# Patient Record
Sex: FEMALE | Race: WHITE | Employment: FULL TIME | URBAN - METROPOLITAN AREA
[De-identification: names, ages, dates, MRNs, and addresses within clinical notes are randomized per-mention and may not be internally consistent; named-entity substitution may affect disease eponyms.]

---

## 2023-02-03 ENCOUNTER — PATIENT MESSAGE (OUTPATIENT)
Dept: INTERNAL MEDICINE | Facility: CLINIC | Age: 33
End: 2023-02-03

## 2023-02-03 ENCOUNTER — OFFICE VISIT (OUTPATIENT)
Dept: INTERNAL MEDICINE | Facility: CLINIC | Age: 33
End: 2023-02-03
Payer: COMMERCIAL

## 2023-02-03 ENCOUNTER — LAB VISIT (OUTPATIENT)
Dept: LAB | Facility: HOSPITAL | Age: 33
End: 2023-02-03
Attending: FAMILY MEDICINE
Payer: COMMERCIAL

## 2023-02-03 VITALS
DIASTOLIC BLOOD PRESSURE: 78 MMHG | HEART RATE: 75 BPM | TEMPERATURE: 98 F | SYSTOLIC BLOOD PRESSURE: 126 MMHG | HEIGHT: 65 IN | BODY MASS INDEX: 24.98 KG/M2 | WEIGHT: 149.94 LBS | OXYGEN SATURATION: 100 %

## 2023-02-03 DIAGNOSIS — Z11.4 SCREENING FOR HIV WITHOUT PRESENCE OF RISK FACTORS: ICD-10-CM

## 2023-02-03 DIAGNOSIS — Z13.1 SCREENING FOR DIABETES MELLITUS: ICD-10-CM

## 2023-02-03 DIAGNOSIS — Z13.220 ENCOUNTER FOR LIPID SCREENING FOR CARDIOVASCULAR DISEASE: ICD-10-CM

## 2023-02-03 DIAGNOSIS — Z13.6 ENCOUNTER FOR LIPID SCREENING FOR CARDIOVASCULAR DISEASE: ICD-10-CM

## 2023-02-03 DIAGNOSIS — Z12.4 PAP SMEAR FOR CERVICAL CANCER SCREENING: ICD-10-CM

## 2023-02-03 DIAGNOSIS — Z00.00 PREVENTATIVE HEALTH CARE: Primary | ICD-10-CM

## 2023-02-03 DIAGNOSIS — Z11.59 ENCOUNTER FOR HEPATITIS C SCREENING TEST FOR LOW RISK PATIENT: ICD-10-CM

## 2023-02-03 DIAGNOSIS — F90.0 ATTENTION DEFICIT HYPERACTIVITY DISORDER (ADHD), PREDOMINANTLY INATTENTIVE TYPE: ICD-10-CM

## 2023-02-03 DIAGNOSIS — Z00.00 PREVENTATIVE HEALTH CARE: ICD-10-CM

## 2023-02-03 DIAGNOSIS — L21.9 SEBORRHEIC DERMATITIS: ICD-10-CM

## 2023-02-03 LAB
CHOLEST SERPL-MCNC: 168 MG/DL (ref 120–199)
CHOLEST/HDLC SERPL: 2.4 {RATIO} (ref 2–5)
GLUCOSE SERPL-MCNC: 96 MG/DL (ref 70–110)
HDLC SERPL-MCNC: 69 MG/DL (ref 40–75)
HDLC SERPL: 41.1 % (ref 20–50)
HIV 1+2 AB+HIV1 P24 AG SERPL QL IA: NORMAL
LDLC SERPL CALC-MCNC: 85 MG/DL (ref 63–159)
NONHDLC SERPL-MCNC: 99 MG/DL
TRIGL SERPL-MCNC: 70 MG/DL (ref 30–150)

## 2023-02-03 PROCEDURE — 87389 HIV-1 AG W/HIV-1&-2 AB AG IA: CPT | Performed by: FAMILY MEDICINE

## 2023-02-03 PROCEDURE — 80061 LIPID PANEL: CPT | Performed by: FAMILY MEDICINE

## 2023-02-03 PROCEDURE — 3008F BODY MASS INDEX DOCD: CPT | Mod: CPTII,S$GLB,, | Performed by: FAMILY MEDICINE

## 2023-02-03 PROCEDURE — 36415 COLL VENOUS BLD VENIPUNCTURE: CPT | Performed by: FAMILY MEDICINE

## 2023-02-03 PROCEDURE — 86803 HEPATITIS C AB TEST: CPT | Performed by: FAMILY MEDICINE

## 2023-02-03 PROCEDURE — 99385 PR PREVENTIVE VISIT,NEW,18-39: ICD-10-PCS | Mod: S$GLB,,, | Performed by: FAMILY MEDICINE

## 2023-02-03 PROCEDURE — 99999 PR PBB SHADOW E&M-NEW PATIENT-LVL IV: CPT | Mod: PBBFAC,,, | Performed by: FAMILY MEDICINE

## 2023-02-03 PROCEDURE — 99999 PR PBB SHADOW E&M-NEW PATIENT-LVL IV: ICD-10-PCS | Mod: PBBFAC,,, | Performed by: FAMILY MEDICINE

## 2023-02-03 PROCEDURE — 3078F PR MOST RECENT DIASTOLIC BLOOD PRESSURE < 80 MM HG: ICD-10-PCS | Mod: CPTII,S$GLB,, | Performed by: FAMILY MEDICINE

## 2023-02-03 PROCEDURE — 3074F PR MOST RECENT SYSTOLIC BLOOD PRESSURE < 130 MM HG: ICD-10-PCS | Mod: CPTII,S$GLB,, | Performed by: FAMILY MEDICINE

## 2023-02-03 PROCEDURE — 99385 PREV VISIT NEW AGE 18-39: CPT | Mod: S$GLB,,, | Performed by: FAMILY MEDICINE

## 2023-02-03 PROCEDURE — 3008F PR BODY MASS INDEX (BMI) DOCUMENTED: ICD-10-PCS | Mod: CPTII,S$GLB,, | Performed by: FAMILY MEDICINE

## 2023-02-03 PROCEDURE — 3074F SYST BP LT 130 MM HG: CPT | Mod: CPTII,S$GLB,, | Performed by: FAMILY MEDICINE

## 2023-02-03 PROCEDURE — 82947 ASSAY GLUCOSE BLOOD QUANT: CPT | Performed by: FAMILY MEDICINE

## 2023-02-03 PROCEDURE — 3078F DIAST BP <80 MM HG: CPT | Mod: CPTII,S$GLB,, | Performed by: FAMILY MEDICINE

## 2023-02-03 RX ORDER — KETOCONAZOLE 20 MG/ML
SHAMPOO, SUSPENSION TOPICAL
Qty: 240 ML | Refills: 2 | Status: SHIPPED | OUTPATIENT
Start: 2023-02-03 | End: 2024-03-11 | Stop reason: SDUPTHER

## 2023-02-03 RX ORDER — KETOCONAZOLE 20 MG/G
CREAM TOPICAL 2 TIMES DAILY
Qty: 30 G | Refills: 1 | Status: SHIPPED | OUTPATIENT
Start: 2023-02-03 | End: 2023-02-22

## 2023-02-03 RX ORDER — HYDROCORTISONE 25 MG/G
CREAM TOPICAL
Qty: 20 G | Refills: 0 | Status: SHIPPED | OUTPATIENT
Start: 2023-02-03 | End: 2024-03-11 | Stop reason: SDUPTHER

## 2023-02-04 LAB — HCV AB SERPL QL IA: NORMAL

## 2023-02-08 ENCOUNTER — PATIENT MESSAGE (OUTPATIENT)
Dept: ADMINISTRATIVE | Facility: HOSPITAL | Age: 33
End: 2023-02-08
Payer: COMMERCIAL

## 2023-02-10 ENCOUNTER — OFFICE VISIT (OUTPATIENT)
Dept: INTERNAL MEDICINE | Facility: CLINIC | Age: 33
End: 2023-02-10
Payer: COMMERCIAL

## 2023-02-10 ENCOUNTER — PATIENT MESSAGE (OUTPATIENT)
Dept: INTERNAL MEDICINE | Facility: CLINIC | Age: 33
End: 2023-02-10

## 2023-02-10 DIAGNOSIS — F90.0 ATTENTION DEFICIT HYPERACTIVITY DISORDER (ADHD), PREDOMINANTLY INATTENTIVE TYPE: Primary | ICD-10-CM

## 2023-02-10 PROCEDURE — 99214 PR OFFICE/OUTPT VISIT, EST, LEVL IV, 30-39 MIN: ICD-10-PCS | Mod: 95,,, | Performed by: FAMILY MEDICINE

## 2023-02-10 PROCEDURE — 1160F RVW MEDS BY RX/DR IN RCRD: CPT | Mod: CPTII,95,, | Performed by: FAMILY MEDICINE

## 2023-02-10 PROCEDURE — 1159F MED LIST DOCD IN RCRD: CPT | Mod: CPTII,95,, | Performed by: FAMILY MEDICINE

## 2023-02-10 PROCEDURE — 1160F PR REVIEW ALL MEDS BY PRESCRIBER/CLIN PHARMACIST DOCUMENTED: ICD-10-PCS | Mod: CPTII,95,, | Performed by: FAMILY MEDICINE

## 2023-02-10 PROCEDURE — 99214 OFFICE O/P EST MOD 30 MIN: CPT | Mod: 95,,, | Performed by: FAMILY MEDICINE

## 2023-02-10 PROCEDURE — 1159F PR MEDICATION LIST DOCUMENTED IN MEDICAL RECORD: ICD-10-PCS | Mod: CPTII,95,, | Performed by: FAMILY MEDICINE

## 2023-02-10 RX ORDER — DEXTROAMPHETAMINE SACCHARATE, AMPHETAMINE ASPARTATE MONOHYDRATE, DEXTROAMPHETAMINE SULFATE AND AMPHETAMINE SULFATE 5; 5; 5; 5 MG/1; MG/1; MG/1; MG/1
20 CAPSULE, EXTENDED RELEASE ORAL EVERY MORNING
Qty: 30 CAPSULE | Refills: 0 | Status: SHIPPED | OUTPATIENT
Start: 2023-02-10 | End: 2023-03-10 | Stop reason: SDUPTHER

## 2023-02-10 NOTE — PATIENT INSTRUCTIONS
"Hi, Miranda.    It was nice seeing you again during your virtual visit today. I sent your prescription (listed below) to your [facility].    If you haven't already done so, please take the time now to schedule a virtual visit appointment with me in about four weeks (less than 30 days). As we discussed, if a timely virtual visit is not available right now, try again on Monday morning to schedule the appointment.     Please read the educational information about your medicine (Adderall-XR) in your After-Visit Summary. (Click on "Appointments," choose today's appointment, and click on "View After Visit Summary.")    Thanks for letting me care for you, and thanks for trusting [facility] with your healthcare needs.    All the best,    HARLEY Gillis MD      Medications Ordered This Encounter   Medications    dextroamphetamine-amphetamine (ADDERALL XR) 20 MG 24 hr capsule     Sig: Take 1 capsule (20 mg total) by mouth every morning.     Dispense:  30 capsule     Refill:  0      "

## 2023-02-10 NOTE — TELEPHONE ENCOUNTER
Rebeca Epps.    Can you please update their HM based on the documentation they provided?    Thank you for your help caring for our patients!    -DEBO

## 2023-02-10 NOTE — PROGRESS NOTES
TELEMEDICINE VIRTUAL VISIT  2/10/23  1:30 PM CST    Visit Details: This visit was a telemedicine virtual visit with synchronous audio and video. Miranda represented that her location at the time of this visit was in the [facility]. Miranda chose and consented to receive these medical services by telemedicine.    Subjective   CHIEF COMPLAINT: Follow-up and ADHD    HPI  Review of Systems   Neurological:  Negative for seizures and memory loss.   Psychiatric/Behavioral:  Positive for decreased concentration. Negative for agitation, confusion, dysphoric mood, hallucinations and suicidal ideas. The patient is not hyperactive.     Assessment & Plan   1. Attention deficit hyperactivity disorder (ADHD), predominantly inattentive type  Overview:  For substantiating documentation, refer to chart media for document uploaded by patient 2/3/2023 at 1:21 PM: %[id number]-[email or web address].[email or web address]    Assessment & Plan:  She is in grad school for political science. Psychological report for ADD evaluation reviewed with patient. She endorses mild depression and moderate anxiety symptoms, but she feels that these originate from her ADD symptoms. We discussed risks and benefits of treatment options.    Orders:  -     dextroamphetamine-amphetamine (ADDERALL XR) 20 MG 24 hr capsule; Take 1 capsule (20 mg total) by mouth every morning.  Dispense: 30 capsule; Refill: 0    Unless noted herein, any chronic conditions are represented as and appear compensated/controlled and stable, and no other significant complaints or concerns were reported.    Follow up in about 4 weeks (around 3/10/2023) for re-evaluate problem(s) discussed today.   Future Appointments   Date Time Provider Department Center   2/16/2023  1:00 PM Rajwinder Ruiz MD [facility]     Objective   Physical Exam  Constitutional:       General: She is not in acute distress.     Appearance: She is not ill-appearing.  "  Pulmonary:      Effort: Pulmonary effort is normal. No respiratory distress.   Skin:     Coloration: Skin is not jaundiced.   Neurological:      Mental Status: She is oriented to person, place, and time. Mental status is at baseline.   Psychiatric:         Mood and Affect: Mood normal.         Behavior: Behavior normal.         Thought Content: Thought content normal.         Judgment: Judgment normal.        Orders Placed This Encounter    dextroamphetamine-amphetamine (ADDERALL XR) 20 MG 24 hr capsule     TOTAL TIME evaluating and managing this patient for this encounter was greater than or equal to 34 minutes. This time was spent personally by me on the following activities: review of patient's past medical history, assessing age-appropriate health maintenance needs, review of any interval history, medication reconciliation, obtaining history from the patient, examination of the patient, reviewing outside records, review of [facility] Controlled Prescription Drug Monitoring database records for the patient, managing and/or ordering prescription medications, educating patient and answering their questions about risks and benefits of treatment options, educating patient and answering their questions about treatment plan, goals of treatment, and follow-up, and final documentation of the visit. This time was exclusive of any separately billable procedures for this patient and exclusive of time spent treating any other patients.    Documentation entered by me for this encounter may have been done in part using speech-recognition technology. Although I have made an effort to ensure accuracy, "sound like" errors may exist and should be interpreted in context.    Each patient to whom medical services are provided by telemedicine is: (1) informed of the relationship between the physician and patient and the respective role of any other health care provider with respect to management of the patient; and " (2) notified that he or she may decline to receive medical services by telemedicine and may withdraw from such care at any time.

## 2023-02-10 NOTE — ASSESSMENT & PLAN NOTE
She is in grad school for political science. Psychological report for ADD evaluation reviewed with patient. She endorses mild depression and moderate anxiety symptoms, but she feels that these originate from her ADD symptoms. We discussed risks and benefits of treatment options.

## 2023-02-16 ENCOUNTER — OFFICE VISIT (OUTPATIENT)
Dept: OBSTETRICS AND GYNECOLOGY | Facility: CLINIC | Age: 33
End: 2023-02-16
Payer: COMMERCIAL

## 2023-02-16 VITALS
SYSTOLIC BLOOD PRESSURE: 141 MMHG | HEIGHT: 65 IN | DIASTOLIC BLOOD PRESSURE: 94 MMHG | BODY MASS INDEX: 24.63 KG/M2 | WEIGHT: 147.81 LBS

## 2023-02-16 DIAGNOSIS — Z12.4 CERVICAL CANCER SCREENING: ICD-10-CM

## 2023-02-16 DIAGNOSIS — Z31.9 PATIENT DESIRES PREGNANCY: ICD-10-CM

## 2023-02-16 DIAGNOSIS — Z01.419 ENCOUNTER FOR WELL WOMAN EXAM WITH ROUTINE GYNECOLOGICAL EXAM: Primary | ICD-10-CM

## 2023-02-16 PROCEDURE — 88175 CYTOPATH C/V AUTO FLUID REDO: CPT | Performed by: OBSTETRICS & GYNECOLOGY

## 2023-02-16 PROCEDURE — 99999 PR PBB SHADOW E&M-EST. PATIENT-LVL III: ICD-10-PCS | Mod: PBBFAC,,, | Performed by: OBSTETRICS & GYNECOLOGY

## 2023-02-16 PROCEDURE — 3077F PR MOST RECENT SYSTOLIC BLOOD PRESSURE >= 140 MM HG: ICD-10-PCS | Mod: CPTII,S$GLB,, | Performed by: OBSTETRICS & GYNECOLOGY

## 2023-02-16 PROCEDURE — 1160F RVW MEDS BY RX/DR IN RCRD: CPT | Mod: CPTII,S$GLB,, | Performed by: OBSTETRICS & GYNECOLOGY

## 2023-02-16 PROCEDURE — 3008F PR BODY MASS INDEX (BMI) DOCUMENTED: ICD-10-PCS | Mod: CPTII,S$GLB,, | Performed by: OBSTETRICS & GYNECOLOGY

## 2023-02-16 PROCEDURE — 3008F BODY MASS INDEX DOCD: CPT | Mod: CPTII,S$GLB,, | Performed by: OBSTETRICS & GYNECOLOGY

## 2023-02-16 PROCEDURE — 3080F DIAST BP >= 90 MM HG: CPT | Mod: CPTII,S$GLB,, | Performed by: OBSTETRICS & GYNECOLOGY

## 2023-02-16 PROCEDURE — 1159F MED LIST DOCD IN RCRD: CPT | Mod: CPTII,S$GLB,, | Performed by: OBSTETRICS & GYNECOLOGY

## 2023-02-16 PROCEDURE — 3077F SYST BP >= 140 MM HG: CPT | Mod: CPTII,S$GLB,, | Performed by: OBSTETRICS & GYNECOLOGY

## 2023-02-16 PROCEDURE — 1159F PR MEDICATION LIST DOCUMENTED IN MEDICAL RECORD: ICD-10-PCS | Mod: CPTII,S$GLB,, | Performed by: OBSTETRICS & GYNECOLOGY

## 2023-02-16 PROCEDURE — 87624 HPV HI-RISK TYP POOLED RSLT: CPT | Performed by: OBSTETRICS & GYNECOLOGY

## 2023-02-16 PROCEDURE — 3080F PR MOST RECENT DIASTOLIC BLOOD PRESSURE >= 90 MM HG: ICD-10-PCS | Mod: CPTII,S$GLB,, | Performed by: OBSTETRICS & GYNECOLOGY

## 2023-02-16 PROCEDURE — 99385 PR PREVENTIVE VISIT,NEW,18-39: ICD-10-PCS | Mod: S$GLB,,, | Performed by: OBSTETRICS & GYNECOLOGY

## 2023-02-16 PROCEDURE — 99999 PR PBB SHADOW E&M-EST. PATIENT-LVL III: CPT | Mod: PBBFAC,,, | Performed by: OBSTETRICS & GYNECOLOGY

## 2023-02-16 PROCEDURE — 99385 PREV VISIT NEW AGE 18-39: CPT | Mod: S$GLB,,, | Performed by: OBSTETRICS & GYNECOLOGY

## 2023-02-16 PROCEDURE — 1160F PR REVIEW ALL MEDS BY PRESCRIBER/CLIN PHARMACIST DOCUMENTED: ICD-10-PCS | Mod: CPTII,S$GLB,, | Performed by: OBSTETRICS & GYNECOLOGY

## 2023-02-16 NOTE — PROGRESS NOTES
Chief Complaint: Annual exam    Chief Complaint   Patient presents with    Well Woman       HPI:   32 y.o.  here today for annual exam, new patient to me. Doing well with no complaints. Denies major medical problems other than ADHD, takes Adderall PRN. Patient reports her cycles are monthly lasting 3-4 days using 3-4 PPD on average, have gotten lighter since she is training for a marathon. Denies passage of clots or cramping. Denies any irregular menstrual bleeding or post-coital bleeding. Denies abnormal breast symptoms, Denies FH of breast, uterine, ovarian, or colon cancer. She is currently sexually active with . Currently using nothing for birth control. She declines STD testing today. She has been vaccinated against COVID-19. She received Gardasil.     Desires fertility testing. Has been having unprotected sex for the past 1 year without conception. Partner has not had SA or other testing done. Has a history of one prior elective AB.     LMP Dates from Last 1 Encounters:   LMP: 2023       Labs / Significant Studies    Pap: Needs, has a history of abnormal with colposcopy    Lab Visit on 2023   Component Date Value Ref Range Status    Cholesterol 2023 168  120 - 199 mg/dL Final    Comment: The National Cholesterol Education Program (NCEP) has set the  following guidelines (reference ranges) for Cholesterol:  Optimal.....................<200 mg/dL  Borderline High.............200-239 mg/dL  High........................> or = 240 mg/dL      Triglycerides 2023 70  30 - 150 mg/dL Final    Comment: The National Cholesterol Education Program (NCEP) has set the  following guidelines (reference values) for triglycerides:  Normal......................<150 mg/dL  Borderline High.............150-199 mg/dL  High........................200-499 mg/dL      HDL 2023 69  40 - 75 mg/dL Final    Comment: The National Cholesterol Education Program (NCEP) has set the  following  guidelines (reference values) for HDL Cholesterol:  Low...............<40 mg/dL  Optimal...........>60 mg/dL      LDL Cholesterol 02/03/2023 85.0  63.0 - 159.0 mg/dL Final    Comment: The National Cholesterol Education Program (NCEP) has set the  following guidelines (reference values) for LDL Cholesterol:  Optimal.......................<130 mg/dL  Borderline High...............130-159 mg/dL  High..........................160-189 mg/dL  Very High.....................>190 mg/dL      HDL/Cholesterol Ratio 02/03/2023 41.1  20.0 - 50.0 % Final    Total Cholesterol/HDL Ratio 02/03/2023 2.4  2.0 - 5.0 Final    Non-HDL Cholesterol 02/03/2023 99  mg/dL Final    Comment: Risk category and Non-HDL cholesterol goals:  Coronary heart disease (CHD)or equivalent (10-year risk of CHD >20%):  Non-HDL cholesterol goal     <130 mg/dL  Two or more CHD risk factors and 10-year risk of CHD <= 20%:  Non-HDL cholesterol goal     <160 mg/dL  0 to 1 CHD risk factor:  Non-HDL cholesterol goal     <190 mg/dL      Glucose 02/03/2023 96  70 - 110 mg/dL Final    Hepatitis C Ab 02/03/2023 Non-reactive  Non-reactive Final    HIV 1/2 Ag/Ab 02/03/2023 Non-reactive  Non-reactive Final        Past Medical History:   Diagnosis Date    ADHD (attention deficit hyperactivity disorder)     Epilepsy, unspecified, not intractable, with status epilepticus      Past Surgical History:   Procedure Laterality Date    TONSILLECTOMY         Current Outpatient Medications:     dextroamphetamine-amphetamine (ADDERALL XR) 20 MG 24 hr capsule, Take 1 capsule (20 mg total) by mouth every morning., Disp: 30 capsule, Rfl: 0    hydrocortisone 2.5 % cream, Apply SPARINGLY to areas of skin irritation twice daily as needed (Use only as needed, max use 5 days.), Disp: 20 g, Rfl: 0    ketoconazole (NIZORAL) 2 % cream, Apply topically 2 (two) times daily. for 14 days, Disp: 30 g, Rfl: 1    ketoconazole (NIZORAL) 2 % shampoo, Apply to a clean scalp and skin under facial hair  twice weekly for 4 weeks., Disp: 240 mL, Rfl: 2  Review of patient's allergies indicates:   Allergen Reactions    Penicillins Rash, Shortness Of Breath and Swelling     OB History   No obstetric history on file.     Social History     Tobacco Use    Smoking status: Never    Smokeless tobacco: Never   Substance Use Topics    Alcohol use: Yes     Alcohol/week: 4.0 standard drinks     Types: 3 Glasses of wine, 1 Cans of beer per week    Drug use: Not Currently     Family History   Problem Relation Age of Onset    Hypertension Mother        Review of Systems   Negative except as in HPI     Physical Exam   Vitals:    02/16/23 1307   BP: (!) 141/94     Body mass index is 24.6 kg/m².    Physical Exam  Constitutional:       General: She is not in acute distress.     Appearance: Normal appearance.   Genitourinary:      Vulva, bladder and urethral meatus normal.      No vaginal discharge or bleeding.        Right Adnexa: not tender, not full and no mass present.     Left Adnexa: not tender, not full and no mass present.     No cervical motion tenderness or lesion.      Uterus is not tender.      No uterine mass detected.     Uterus is anteverted.      Bladder is not tender.       Pelvic exam was performed with patient in the lithotomy position.   Breasts:     Right: Normal. No mass, nipple discharge, skin change or tenderness.      Left: Normal. No mass, nipple discharge, skin change or tenderness.   HENT:      Head: Normocephalic and atraumatic.   Neck:      Thyroid: No thyroid mass, thyromegaly or thyroid tenderness.   Pulmonary:      Effort: Pulmonary effort is normal.   Abdominal:      General: Bowel sounds are normal. There is no distension.      Palpations: Abdomen is soft. There is no mass.      Tenderness: There is no abdominal tenderness. There is no guarding.   Musculoskeletal:         General: Normal range of motion.      Cervical back: Normal range of motion.   Lymphadenopathy:      Cervical: No cervical  adenopathy.      Upper Body:      Right upper body: No supraclavicular, axillary or pectoral adenopathy.      Left upper body: No supraclavicular, axillary or pectoral adenopathy.   Neurological:      General: No focal deficit present.      Mental Status: She is alert and oriented to person, place, and time.   Skin:     General: Skin is warm and dry.   Psychiatric:         Mood and Affect: Mood normal.         Behavior: Behavior normal.         Thought Content: Thought content normal.         Judgment: Judgment normal.   Exam conducted with a chaperone present.        ASSESSMENT:   Annual Well Women Exam  Patient Active Problem List   Diagnosis    Seborrheic dermatitis    Attention deficit hyperactivity disorder (ADHD), predominantly inattentive type    Encounter for well woman exam with routine gynecological exam     Health Maintenance Due   Topic Date Due    Cervical Cancer Screening  Never done    COVID-19 Vaccine (4 - Booster for Moderna series) 01/18/2022    Influenza Vaccine (1) 09/01/2022     Health Maintenance Topics with due status: Not Due       Topic Last Completion Date    TETANUS VACCINE 04/01/2018       PLAN:  Problem List Items Addressed This Visit          Renal/    Encounter for well woman exam with routine gynecological exam - Primary    Current Assessment & Plan     Normal breast and pelvic exams except as noted. Pap/cotesting collected, STD screening declined. Continue breast self awareness, recommend 30 minutes of exercise 5 times weekly. RTC 1 year for annual exam, sooner PRN.     No conception in >12 months despite regular periods and unprotected intercourse. Will get baseline hormone labs on cycle day 3-4. Encouraged patient to discuss partner SA. If normal results, consider [facility] referral.           Other Visit Diagnoses       Cervical cancer screening        Relevant Orders    Liquid-Based Pap Smear, Screening    HPV High Risk Genotypes, PCR    Patient desires pregnancy        Relevant  Orders    Estradiol    Follicle Stimulating Hormone    LUTEINIZING HORMONE    ANTIMULLERIAN HORMONE ([facility])    Prolactin    Hemoglobin A1C    TSH            Follow up in about 1 year (around 2/16/2024) for Annual.       Rajwinder Ruiz MD  Department of Obstetrics & Gynecology  [facility]

## 2023-02-16 NOTE — ASSESSMENT & PLAN NOTE
Normal breast and pelvic exams except as noted. Pap/cotesting collected, STD screening declined. Continue breast self awareness, recommend 30 minutes of exercise 5 times weekly. RTC 1 year for annual exam, sooner PRN.     No conception in >12 months despite regular periods and unprotected intercourse. Will get baseline hormone labs on cycle day 3-4. Encouraged patient to discuss partner SA. If normal results, consider [facility] referral.

## 2023-02-23 LAB
FINAL PATHOLOGIC DIAGNOSIS: NORMAL
HPV HR 12 DNA SPEC QL NAA+PROBE: NEGATIVE
HPV16 AG SPEC QL: NEGATIVE
HPV18 DNA SPEC QL NAA+PROBE: NEGATIVE
Lab: NORMAL

## 2023-03-04 NOTE — PROGRESS NOTES
OFFICE VISIT 2/3/23 11:00 AM CST    Subjective   CHIEF COMPLAINT: Annual Exam and Establish Care    HPI  Review of Systems     Assessment & Plan   1. Preventative health care  -     Lipid Panel; Future; Expected date: 02/03/2023  -     Glucose, Random; Future; Expected date: 02/03/2023  -     Hepatitis C Antibody; Future; Expected date: 02/03/2023  -     HIV 1/2 Ag/Ab (4th Gen); Future; Expected date: 02/03/2023  -     Ambulatory referral/consult to Gynecology; Future; Expected date: 02/10/2023    2. Seborrheic dermatitis  -     ketoconazole (NIZORAL) 2 % cream; Apply topically 2 (two) times daily. for 14 days  Dispense: 30 g; Refill: 1  -     ketoconazole (NIZORAL) 2 % shampoo; Apply to a clean scalp and skin under facial hair twice weekly for 4 weeks.  Dispense: 240 mL; Refill: 2  -     hydrocortisone 2.5 % cream; Apply SPARINGLY to areas of skin irritation twice daily as needed (Use only as needed, max use 5 days.)  Dispense: 20 g; Refill: 0    3. Attention deficit hyperactivity disorder (ADHD), predominantly inattentive type  Overview:  For substantiating documentation, refer to chart media for document uploaded by patient 2/3/2023 at 1:21 PM: [email or web address].[email or web address]      4. Pap smear for cervical cancer screening  -     Ambulatory referral/consult to Gynecology; Future; Expected date: 02/10/2023    5. Screening for HIV without presence of risk factors  -     HIV 1/2 Ag/Ab (4th Gen); Future; Expected date: 02/03/2023    6. Encounter for hepatitis C screening test for low risk patient  -     Hepatitis C Antibody; Future; Expected date: 02/03/2023    7. Encounter for lipid screening for cardiovascular disease  -     Lipid Panel; Future; Expected date: 02/03/2023    8. Screening for diabetes mellitus  -     Glucose, Random; Future; Expected date: 02/03/2023    Unless noted herein, any chronic conditions are represented as and appear stable, and no other  "significant complaints or concerns were reported.    Follow up for virtual visit to review results and to discuss ADHD..   Future Appointments   Date Time Provider Department Center   3/10/2023 11:00 AM SHANICE Gillis MD [facility] [place]   3/13/2023 11:00 AM LABORATORY, [facility] [place]     Objective   Vitals:    02/03/23 1102   BP: 126/78   BP Location: Left arm   Patient Position: Sitting   BP Method: Small (Manual)   Pulse: 75   Temp: 97.7 °F (36.5 °C)   SpO2: 100%   Weight: 68 kg (149 lb 14.6 oz)   Height: 5' 5" (1.651 m)   Physical Exam  Vitals reviewed.   Constitutional:       General: She is not in acute distress.     Appearance: Normal appearance. She is not ill-appearing, toxic-appearing or diaphoretic.   HENT:      Head: Normocephalic and atraumatic.   Eyes:      General: No scleral icterus.     Conjunctiva/sclera: Conjunctivae normal.   Cardiovascular:      Rate and Rhythm: Normal rate and regular rhythm.   Pulmonary:      Effort: Pulmonary effort is normal.      Breath sounds: Normal breath sounds.   Abdominal:      Palpations: Abdomen is soft.      Tenderness: There is no abdominal tenderness.   Skin:     General: Skin is warm and dry.   Neurological:      Mental Status: She is alert and oriented to person, place, and time. Mental status is at baseline.   Psychiatric:         Mood and Affect: Mood normal.         Behavior: Behavior normal.         Judgment: Judgment normal.     Documentation entered by me for this encounter may have been done in part using speech-recognition technology. Although I have made an effort to ensure accuracy, "sound like" errors may exist and should be interpreted in context.   "

## 2023-03-10 ENCOUNTER — OFFICE VISIT (OUTPATIENT)
Dept: INTERNAL MEDICINE | Facility: CLINIC | Age: 33
End: 2023-03-10
Payer: COMMERCIAL

## 2023-03-10 DIAGNOSIS — F90.0 ATTENTION DEFICIT HYPERACTIVITY DISORDER (ADHD), PREDOMINANTLY INATTENTIVE TYPE: Primary | ICD-10-CM

## 2023-03-10 DIAGNOSIS — R03.0 ELEVATED BLOOD PRESSURE READING WITHOUT DIAGNOSIS OF HYPERTENSION: ICD-10-CM

## 2023-03-10 PROCEDURE — 1160F PR REVIEW ALL MEDS BY PRESCRIBER/CLIN PHARMACIST DOCUMENTED: ICD-10-PCS | Mod: CPTII,95,, | Performed by: FAMILY MEDICINE

## 2023-03-10 PROCEDURE — 99213 OFFICE O/P EST LOW 20 MIN: CPT | Mod: 95,,, | Performed by: FAMILY MEDICINE

## 2023-03-10 PROCEDURE — 1160F RVW MEDS BY RX/DR IN RCRD: CPT | Mod: CPTII,95,, | Performed by: FAMILY MEDICINE

## 2023-03-10 PROCEDURE — 1159F PR MEDICATION LIST DOCUMENTED IN MEDICAL RECORD: ICD-10-PCS | Mod: CPTII,95,, | Performed by: FAMILY MEDICINE

## 2023-03-10 PROCEDURE — 99213 PR OFFICE/OUTPT VISIT, EST, LEVL III, 20-29 MIN: ICD-10-PCS | Mod: 95,,, | Performed by: FAMILY MEDICINE

## 2023-03-10 PROCEDURE — 1159F MED LIST DOCD IN RCRD: CPT | Mod: CPTII,95,, | Performed by: FAMILY MEDICINE

## 2023-03-10 RX ORDER — DEXTROAMPHETAMINE SACCHARATE, AMPHETAMINE ASPARTATE MONOHYDRATE, DEXTROAMPHETAMINE SULFATE AND AMPHETAMINE SULFATE 5; 5; 5; 5 MG/1; MG/1; MG/1; MG/1
20 CAPSULE, EXTENDED RELEASE ORAL EVERY MORNING
Qty: 30 CAPSULE | Refills: 0 | Status: SHIPPED | OUTPATIENT
Start: 2023-03-10 | End: 2023-03-10 | Stop reason: DRUGHIGH

## 2023-03-10 RX ORDER — DEXTROAMPHETAMINE SACCHARATE, AMPHETAMINE ASPARTATE MONOHYDRATE, DEXTROAMPHETAMINE SULFATE AND AMPHETAMINE SULFATE 6.25; 6.25; 6.25; 6.25 MG/1; MG/1; MG/1; MG/1
25 CAPSULE, EXTENDED RELEASE ORAL EVERY MORNING
Qty: 30 CAPSULE | Refills: 0 | Status: SHIPPED | OUTPATIENT
Start: 2023-03-10 | End: 2023-04-10 | Stop reason: SDUPTHER

## 2023-03-10 NOTE — ASSESSMENT & PLAN NOTE
Miranda reports that current treatment (Adderall-XR) is providing relief of attention deficit disorder symptoms and helping compensate for deficits and improve level of functioning. Miranda appears to be tolerating current treatment well, reporting no mood instability, tics, disordered sleep, rapid heart rate, palpitations or other apparent adverse effects. I have observed no behaviors to suggest inappropriate use of prescribed medications. [place] Board of Pharmacy Controlled Prescription Drug Monitoring database was queried and showed no activity to suggest abuse, diversion, or other inappropriate use of prescription medications. Pharmacy reports Adderall-XR 20 mg is on indefinite backorder, and closest equivalent currently available is Adderall-XR 25 mg. We discussed risks and benefits of treatment options.

## 2023-03-10 NOTE — PROGRESS NOTES
TELEMEDICINE VIRTUAL VISIT  3/10/23 11:00 AM CST    Visit Details: This visit was a telemedicine virtual visit with synchronous audio and video. Miranda represented that her location at the time of this visit was in the [facility]. Miranda chose and consented to receive these medical services by telemedicine.    Subjective   CHIEF COMPLAINT: Follow-up and Medication Refill    HPI  Review of Systems   Constitutional:  Negative for activity change and unexpected weight change.   HENT:  Negative for hearing loss, rhinorrhea and trouble swallowing.    Eyes:  Negative for discharge and visual disturbance.   Respiratory:  Negative for chest tightness and wheezing.    Cardiovascular:  Negative for chest pain and palpitations.   Gastrointestinal:  Negative for blood in stool, constipation, diarrhea and vomiting.   Endocrine: Negative for polydipsia and polyuria.   Genitourinary:  Negative for difficulty urinating, dysuria, hematuria and menstrual problem.   Musculoskeletal:  Negative for arthralgias, joint swelling and neck pain.   Neurological:  Negative for weakness and headaches.   Psychiatric/Behavioral:  Negative for agitation, confusion, dysphoric mood and sleep disturbance.       Assessment & Plan   1. Attention deficit hyperactivity disorder (ADHD), predominantly inattentive type  Overview:  For substantiating documentation, refer to chart media for document uploaded by patient 2/3/2023 at 1:21 PM: [id number].[email or web address]    Assessment & Plan:  Miranda reports that current treatment (Adderall-XR) is providing relief of attention deficit disorder symptoms and helping compensate for deficits and improve level of functioning. Miranda appears to be tolerating current treatment well, reporting no mood instability, tics, disordered sleep, rapid heart rate, palpitations or other apparent adverse effects. I have observed no behaviors to suggest inappropriate use of  prescribed medications. [place] Board  Pharmacy Controlled Prescription Drug Monitoring database was queried and showed no activity to suggest abuse, diversion, or other inappropriate use of prescription medications. Pharmacy reports Adderall-XR 20 mg is on indefinite backorder, and closest equivalent currently available is Adderall-XR 25 mg. We discussed risks and benefits of treatment options.    Orders:  -     Discontinue: dextroamphetamine-amphetamine (ADDERALL XR) 20 MG 24 hr capsule; Take 1 capsule (20 mg total) by mouth every morning.  Dispense: 30 capsule; Refill: 0  -     dextroamphetamine-amphetamine (ADDERALL XR) 25 MG 24 hr capsule; Take 1 capsule (25 mg total) by mouth every morning.  Dispense: 30 capsule; Refill: 0    2. Elevated blood pressure reading without diagnosis of hypertension    Unless noted herein, any chronic conditions are represented as and appear compensated/controlled and stable, and no other significant complaints or concerns were reported.  Schedule Nurse Visit blood pressure check in 2 weeks.  If BP WNL, anticipate refilling Adderall.  If BP high, anticipating initiating hydrochlorothiazide and refilling Adderall, encouraging Hypertension Digital Medicine program.  Schedule virtual visit with me in 11-12 weeks.  Follow up in about 11 weeks (around 5/26/2023).     Objective   Physical Exam  Constitutional:       General: She is not in acute distress.     Appearance: She is not ill-appearing.   Pulmonary:      Effort: Pulmonary effort is normal. No respiratory distress.   Skin:     Coloration: Skin is not jaundiced.   Neurological:      Mental Status: Mental status is at baseline.   Psychiatric:         Mood and Affect: Mood normal.         Behavior: Behavior normal.     Orders Placed This Encounter    dextroamphetamine-amphetamine (ADDERALL XR) 25 MG 24 hr capsule     TOTAL TIME evaluating and managing this patient for this encounter was greater than or equal to 20 minutes. This  "time was spent personally by me on the following activities: review of patient's past medical history, assessing age-appropriate health maintenance needs, review of any interval history, medication reconciliation, obtaining history from the patient, examination of the patient, evaluation of the patient's response to treatment, review of [place] Board  Pharmacy Controlled Prescription Drug Monitoring database records for the patient, managing and/or ordering prescription medications, educating patient and answering their questions about treatment plan, goals of treatment, and follow-up, and final documentation of the visit. This time was exclusive of any separately billable procedures for this patient and exclusive of time spent treating any other patients.    Documentation entered by me for this encounter may have been done in part using speech-recognition technology. Although I have made an effort to ensure accuracy, "sound like" errors may exist and should be interpreted in context.    Each patient to whom medical services are provided by telemedicine is: (1) informed of the relationship between the physician and patient and the respective role of any other health care provider with respect to management of the patient; and (2) notified that he or she may decline to receive medical services by telemedicine and may withdraw from such care at any time.  "

## 2023-03-24 ENCOUNTER — CLINICAL SUPPORT (OUTPATIENT)
Dept: INTERNAL MEDICINE | Facility: CLINIC | Age: 33
End: 2023-03-24
Payer: COMMERCIAL

## 2023-03-24 ENCOUNTER — PATIENT MESSAGE (OUTPATIENT)
Dept: INTERNAL MEDICINE | Facility: CLINIC | Age: 33
End: 2023-03-24

## 2023-03-24 ENCOUNTER — TELEPHONE (OUTPATIENT)
Dept: INTERNAL MEDICINE | Facility: CLINIC | Age: 33
End: 2023-03-24
Payer: COMMERCIAL

## 2023-03-24 VITALS — DIASTOLIC BLOOD PRESSURE: 92 MMHG | SYSTOLIC BLOOD PRESSURE: 140 MMHG

## 2023-03-24 DIAGNOSIS — I10 ESSENTIAL HYPERTENSION: Chronic | ICD-10-CM

## 2023-03-24 PROCEDURE — 99999 PR PBB SHADOW E&M-EST. PATIENT-LVL II: CPT | Mod: PBBFAC,,,

## 2023-03-24 PROCEDURE — 99999 PR PBB SHADOW E&M-EST. PATIENT-LVL II: ICD-10-PCS | Mod: PBBFAC,,,

## 2023-03-24 RX ORDER — CHLORTHALIDONE 25 MG/1
25 TABLET ORAL DAILY
Qty: 30 TABLET | Refills: 0 | Status: SHIPPED | OUTPATIENT
Start: 2023-03-24 | End: 2023-03-30 | Stop reason: ALTCHOICE

## 2023-03-24 NOTE — PROGRESS NOTES
BP Readings from Last 6 Encounters:   03/24/23 (!) 140/92   02/16/23 (!) 141/94   02/03/23 126/78     D/W patient.  PLAN: Start Chlorthalidone. Return for re-evaluation 3/30/23.    Future Appointments   Date Time Provider Department Center   3/30/2023  3:00 PM SHANICE Gillis MD [facility] [place]   5/26/2023  9:00 AM SHANICE Gillis MD [facility] Miranda.    I sent your prescription for your blood pressure medicine (listed below) to [facility]. Please start taking the medicine today.    I want you to be successful in taking care of yourself and controlling your blood pressure, so I've entered a referral order for you to be able to participate in [facility]'s excellent Hypertension Digital Medicine program.    The program allows you to use an electronic blood pressure monitor to measure your blood pressure at home. Those numbers are automatically transmitted, via your smartphone, to your hypertension care team for review. You'll receive regular feedback from your care team, including recommendations for any needed medication adjustments, exercise and healthy eating tips, heart-healthy coaching, and monthly progress reports.    The program offers essential information and expert care on the four key ways blood pressure is managed:  1. Healthy eating;  2. Physical activity;  3. Self-monitoring; and  4. Medication.    Enrolling is easy and quick. Just call the [facility] Help Desk at [phone] to get started. You can also stop by the O-bar in the first-floor lobby at [facility] for help getting set up and for in-person technical support.    Managing blood pressure is a lifelong journey. High blood pressure does not have to slow you down. With the help of our dedicated specialists and valuable education, you will have the support you need to manage your blood pressure with confidence so you can focus on what's most important to you.    All the  HARLEY izaguirre MD      Medications Ordered This Encounter   Medications    chlorthalidone (HYGROTEN) 25 MG Tab     Sig: Take 1 tablet (25 mg total) by mouth once daily.     Dispense:  30 tablet     Refill:  0     Blood pressure check required for further refills.

## 2023-03-24 NOTE — PROGRESS NOTES
PT came in for b/p check. Pt b/p elevated 140/92. Notified provider. Pt schedule for visit with provider on 3/30/2023 3:00 pm

## 2023-03-29 DIAGNOSIS — I10 ESSENTIAL HYPERTENSION: ICD-10-CM

## 2023-03-30 ENCOUNTER — PATIENT MESSAGE (OUTPATIENT)
Dept: ADMINISTRATIVE | Facility: OTHER | Age: 33
End: 2023-03-30
Payer: COMMERCIAL

## 2023-03-30 ENCOUNTER — OFFICE VISIT (OUTPATIENT)
Dept: INTERNAL MEDICINE | Facility: CLINIC | Age: 33
End: 2023-03-30
Payer: COMMERCIAL

## 2023-03-30 VITALS
HEART RATE: 100 BPM | TEMPERATURE: 96 F | HEIGHT: 65 IN | OXYGEN SATURATION: 100 % | WEIGHT: 140.19 LBS | DIASTOLIC BLOOD PRESSURE: 92 MMHG | SYSTOLIC BLOOD PRESSURE: 150 MMHG | BODY MASS INDEX: 23.36 KG/M2

## 2023-03-30 DIAGNOSIS — I10 ESSENTIAL HYPERTENSION: Primary | Chronic | ICD-10-CM

## 2023-03-30 DIAGNOSIS — F41.0 GENERALIZED ANXIETY DISORDER WITH PANIC ATTACKS: Chronic | ICD-10-CM

## 2023-03-30 DIAGNOSIS — F41.1 GENERALIZED ANXIETY DISORDER WITH PANIC ATTACKS: Chronic | ICD-10-CM

## 2023-03-30 PROCEDURE — 3077F SYST BP >= 140 MM HG: CPT | Mod: CPTII,S$GLB,, | Performed by: FAMILY MEDICINE

## 2023-03-30 PROCEDURE — 3080F PR MOST RECENT DIASTOLIC BLOOD PRESSURE >= 90 MM HG: ICD-10-PCS | Mod: CPTII,S$GLB,, | Performed by: FAMILY MEDICINE

## 2023-03-30 PROCEDURE — 3008F BODY MASS INDEX DOCD: CPT | Mod: CPTII,S$GLB,, | Performed by: FAMILY MEDICINE

## 2023-03-30 PROCEDURE — 99214 PR OFFICE/OUTPT VISIT, EST, LEVL IV, 30-39 MIN: ICD-10-PCS | Mod: S$GLB,,, | Performed by: FAMILY MEDICINE

## 2023-03-30 PROCEDURE — 99214 OFFICE O/P EST MOD 30 MIN: CPT | Mod: S$GLB,,, | Performed by: FAMILY MEDICINE

## 2023-03-30 PROCEDURE — 3080F DIAST BP >= 90 MM HG: CPT | Mod: CPTII,S$GLB,, | Performed by: FAMILY MEDICINE

## 2023-03-30 PROCEDURE — 99999 PR PBB SHADOW E&M-EST. PATIENT-LVL V: CPT | Mod: PBBFAC,,, | Performed by: FAMILY MEDICINE

## 2023-03-30 PROCEDURE — 99999 PR PBB SHADOW E&M-EST. PATIENT-LVL V: ICD-10-PCS | Mod: PBBFAC,,, | Performed by: FAMILY MEDICINE

## 2023-03-30 PROCEDURE — 1160F RVW MEDS BY RX/DR IN RCRD: CPT | Mod: CPTII,S$GLB,, | Performed by: FAMILY MEDICINE

## 2023-03-30 PROCEDURE — 1159F MED LIST DOCD IN RCRD: CPT | Mod: CPTII,S$GLB,, | Performed by: FAMILY MEDICINE

## 2023-03-30 PROCEDURE — 3077F PR MOST RECENT SYSTOLIC BLOOD PRESSURE >= 140 MM HG: ICD-10-PCS | Mod: CPTII,S$GLB,, | Performed by: FAMILY MEDICINE

## 2023-03-30 PROCEDURE — 3008F PR BODY MASS INDEX (BMI) DOCUMENTED: ICD-10-PCS | Mod: CPTII,S$GLB,, | Performed by: FAMILY MEDICINE

## 2023-03-30 PROCEDURE — 1159F PR MEDICATION LIST DOCUMENTED IN MEDICAL RECORD: ICD-10-PCS | Mod: CPTII,S$GLB,, | Performed by: FAMILY MEDICINE

## 2023-03-30 PROCEDURE — 1160F PR REVIEW ALL MEDS BY PRESCRIBER/CLIN PHARMACIST DOCUMENTED: ICD-10-PCS | Mod: CPTII,S$GLB,, | Performed by: FAMILY MEDICINE

## 2023-03-30 RX ORDER — METOPROLOL SUCCINATE 50 MG/1
50 TABLET, EXTENDED RELEASE ORAL DAILY
Qty: 30 TABLET | Refills: 1 | Status: SHIPPED | OUTPATIENT
Start: 2023-03-30 | End: 2023-04-10 | Stop reason: SDUPTHER
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
## 2023-03-30 NOTE — ASSESSMENT & PLAN NOTE
This is a chronic problem, with exacerbation or progression.  BP Readings from Last 6 Encounters:   03/30/23 (!) 150/92   03/24/23 (!) 140/92   02/16/23 (!) 141/94   02/03/23 126/78

## 2023-03-30 NOTE — PATIENT INSTRUCTIONS
"I have referred you to see one of [facility] excellent mental health specialists for counseling services.    You must act SOON and call [phone] to schedule your appointment.     Mental health specialists are in high demand, so here are some tips for scheduling a timely appointment.  I have seen appointments delayed because departmental staff and the patient fail to connect by phone, with the record showing numerous missed calls and missed call-backs. If you call and get the departmental voicemail, in your message ask them to contact you by [facility] message to schedule your appointment (assuming you check your [facility] messages regularly). This will prevent the "phone-tag" that sometimes happens.  I encourage you to ask to be placed on the waiting list for canceled appointments because new appointments often open up a few days in advance when someone cancels or reschedules their appointment.  Contact your insurance company for a list of other reputable mental health specialists to see if you are able to get a more timely appointment with one of them.  Check PsychologyToday.com for listings and ratings for local mental health specialists.    Need Support Now?  If you or someone you know is struggling or in crisis, help is available. Call or text 988 or chat [employer]  988 offers 24/7 access to trained crisis counselors who can help people experiencing mental health-related distress. That could be:  Thoughts of suicide,  Mental health or substance use crisis, or  Any other kind of emotion distress.      "
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
## 2023-03-31 ENCOUNTER — TELEPHONE (OUTPATIENT)
Dept: INTERNAL MEDICINE | Facility: CLINIC | Age: 33
End: 2023-03-31
Payer: COMMERCIAL

## 2023-03-31 DIAGNOSIS — Z13.220 ENCOUNTER FOR LIPID SCREENING FOR CARDIOVASCULAR DISEASE: ICD-10-CM

## 2023-03-31 DIAGNOSIS — I10 ESSENTIAL HYPERTENSION: ICD-10-CM

## 2023-03-31 DIAGNOSIS — Z00.00 PREVENTATIVE HEALTH CARE: Primary | ICD-10-CM

## 2023-03-31 DIAGNOSIS — Z13.1 SCREENING FOR DIABETES MELLITUS: ICD-10-CM

## 2023-03-31 DIAGNOSIS — Z13.6 ENCOUNTER FOR LIPID SCREENING FOR CARDIOVASCULAR DISEASE: ICD-10-CM

## 2023-03-31 NOTE — TELEPHONE ENCOUNTER
Pt completed lab visit on 3/30/2023 and  states the lab told her she didn't give enough blood. Pt is requesting for lab orders to be placed so she can complete labs.

## 2023-03-31 NOTE — TELEPHONE ENCOUNTER
Py completed lab visit on 3/30/2023 and  states she was told she didn't give enough blood. Pt is requesting for lab orders to be placed so she can complete labs.

## 2023-03-31 NOTE — TELEPHONE ENCOUNTER
----- Message from Mindy Dowling sent at 3/31/2023 10:46 AM CDT -----  Contact: [phone]/  Pt is calling requesting more lab orders . Pt states when she went the lab was able to get enough blood from her . Please call back at [phone]/nanda . Mayij

## 2023-03-31 NOTE — TELEPHONE ENCOUNTER
"Her labs all show a status of "pending," so I don't know which ones must be re-ordered.    Please call lab and clarify what labs they need re-ordered.    Thanks.  "

## 2023-04-06 NOTE — TELEPHONE ENCOUNTER
I re-ordered my labs. She has other labs that are due.    TO MY TEAM:  Please contact her to schedule labs ASAP. Thanks.    Lab   Estradiol   LUTEINIZING HORMONE   ANTIMULLERIAN HORMONE ([facility])   Prolactin   Hemoglobin A1C   Lipid Panel   TSH   Comprehensive Metabolic Panel      Future Appointments   Date Time Provider Department Center   4/10/2023 10:30 AM EKG, [facility] [place]   4/10/2023 11:20 AM ARNOLD Orellana  [place]   6/15/2023  1:00 PM [facility] [place]

## 2023-04-10 ENCOUNTER — OFFICE VISIT (OUTPATIENT)
Dept: INTERNAL MEDICINE | Facility: CLINIC | Age: 33
End: 2023-04-10
Payer: COMMERCIAL

## 2023-04-10 ENCOUNTER — HOSPITAL ENCOUNTER (OUTPATIENT)
Dept: CARDIOLOGY | Facility: HOSPITAL | Age: 33
Discharge: HOME | End: 2023-04-10
Attending: FAMILY MEDICINE
Payer: COMMERCIAL

## 2023-04-10 VITALS
HEIGHT: 65 IN | TEMPERATURE: 99 F | BODY MASS INDEX: 23.98 KG/M2 | HEART RATE: 99 BPM | OXYGEN SATURATION: 97 % | DIASTOLIC BLOOD PRESSURE: 78 MMHG | SYSTOLIC BLOOD PRESSURE: 128 MMHG | WEIGHT: 143.94 LBS

## 2023-04-10 DIAGNOSIS — F90.0 ATTENTION DEFICIT HYPERACTIVITY DISORDER (ADHD), PREDOMINANTLY INATTENTIVE TYPE: ICD-10-CM

## 2023-04-10 DIAGNOSIS — I10 ESSENTIAL HYPERTENSION: Chronic | ICD-10-CM

## 2023-04-10 PROCEDURE — 99213 PR OFFICE/OUTPT VISIT, EST, LEVL III, 20-29 MIN: ICD-10-PCS | Mod: S$GLB,,, | Performed by: PHYSICIAN ASSISTANT

## 2023-04-10 PROCEDURE — 1160F PR REVIEW ALL MEDS BY PRESCRIBER/CLIN PHARMACIST DOCUMENTED: ICD-10-PCS | Mod: CPTII,S$GLB,, | Performed by: PHYSICIAN ASSISTANT

## 2023-04-10 PROCEDURE — 3074F PR MOST RECENT SYSTOLIC BLOOD PRESSURE < 130 MM HG: ICD-10-PCS | Mod: CPTII,S$GLB,, | Performed by: PHYSICIAN ASSISTANT

## 2023-04-10 PROCEDURE — 99999 PR PBB SHADOW E&M-EST. PATIENT-LVL III: ICD-10-PCS | Mod: PBBFAC,,, | Performed by: PHYSICIAN ASSISTANT

## 2023-04-10 PROCEDURE — 3074F SYST BP LT 130 MM HG: CPT | Mod: CPTII,S$GLB,, | Performed by: PHYSICIAN ASSISTANT

## 2023-04-10 PROCEDURE — 1159F MED LIST DOCD IN RCRD: CPT | Mod: CPTII,S$GLB,, | Performed by: PHYSICIAN ASSISTANT

## 2023-04-10 PROCEDURE — 3008F BODY MASS INDEX DOCD: CPT | Mod: CPTII,S$GLB,, | Performed by: PHYSICIAN ASSISTANT

## 2023-04-10 PROCEDURE — 99999 PR PBB SHADOW E&M-EST. PATIENT-LVL III: CPT | Mod: PBBFAC,,, | Performed by: PHYSICIAN ASSISTANT

## 2023-04-10 PROCEDURE — 93005 ELECTROCARDIOGRAM TRACING: CPT

## 2023-04-10 PROCEDURE — 3008F PR BODY MASS INDEX (BMI) DOCUMENTED: ICD-10-PCS | Mod: CPTII,S$GLB,, | Performed by: PHYSICIAN ASSISTANT

## 2023-04-10 PROCEDURE — 3078F PR MOST RECENT DIASTOLIC BLOOD PRESSURE < 80 MM HG: ICD-10-PCS | Mod: CPTII,S$GLB,, | Performed by: PHYSICIAN ASSISTANT

## 2023-04-10 PROCEDURE — 3078F DIAST BP <80 MM HG: CPT | Mod: CPTII,S$GLB,, | Performed by: PHYSICIAN ASSISTANT

## 2023-04-10 PROCEDURE — 1160F RVW MEDS BY RX/DR IN RCRD: CPT | Mod: CPTII,S$GLB,, | Performed by: PHYSICIAN ASSISTANT

## 2023-04-10 PROCEDURE — 93010 ELECTROCARDIOGRAM REPORT: CPT | Mod: ,,, | Performed by: INTERNAL MEDICINE

## 2023-04-10 PROCEDURE — 99213 OFFICE O/P EST LOW 20 MIN: CPT | Mod: S$GLB,,, | Performed by: PHYSICIAN ASSISTANT

## 2023-04-10 PROCEDURE — 93010 EKG 12-LEAD: ICD-10-PCS | Mod: ,,, | Performed by: INTERNAL MEDICINE

## 2023-04-10 PROCEDURE — 1159F PR MEDICATION LIST DOCUMENTED IN MEDICAL RECORD: ICD-10-PCS | Mod: CPTII,S$GLB,, | Performed by: PHYSICIAN ASSISTANT

## 2023-04-10 RX ORDER — DEXTROAMPHETAMINE SACCHARATE, AMPHETAMINE ASPARTATE MONOHYDRATE, DEXTROAMPHETAMINE SULFATE AND AMPHETAMINE SULFATE 6.25; 6.25; 6.25; 6.25 MG/1; MG/1; MG/1; MG/1
25 CAPSULE, EXTENDED RELEASE ORAL EVERY MORNING
Qty: 30 CAPSULE | Refills: 0 | Status: SHIPPED | OUTPATIENT
Start: 2023-05-10 | End: 2023-06-30 | Stop reason: SDUPTHER

## 2023-04-10 RX ORDER — METOPROLOL SUCCINATE 50 MG/1
50 TABLET, EXTENDED RELEASE ORAL DAILY
Qty: 90 TABLET | Refills: 3 | Status: SHIPPED | OUTPATIENT
Start: 2023-04-10 | End: 2023-06-30 | Stop reason: SDUPTHER

## 2023-04-10 RX ORDER — DEXTROAMPHETAMINE SACCHARATE, AMPHETAMINE ASPARTATE MONOHYDRATE, DEXTROAMPHETAMINE SULFATE AND AMPHETAMINE SULFATE 6.25; 6.25; 6.25; 6.25 MG/1; MG/1; MG/1; MG/1
25 CAPSULE, EXTENDED RELEASE ORAL EVERY MORNING
Qty: 30 CAPSULE | Refills: 0 | Status: SHIPPED | OUTPATIENT
Start: 2023-04-10 | End: 2023-06-09

## 2023-04-10 RX ORDER — DEXTROAMPHETAMINE SACCHARATE, AMPHETAMINE ASPARTATE MONOHYDRATE, DEXTROAMPHETAMINE SULFATE AND AMPHETAMINE SULFATE 6.25; 6.25; 6.25; 6.25 MG/1; MG/1; MG/1; MG/1
25 CAPSULE, EXTENDED RELEASE ORAL EVERY MORNING
Qty: 30 CAPSULE | Refills: 0 | Status: SHIPPED | OUTPATIENT
Start: 2023-04-10 | End: 2023-05-10

## 2023-04-10 NOTE — PROGRESS NOTES
Subjective:      Patient ID: Miranda Garcia is a 32 y.o. female.    Chief Complaint: Follow-up    HPI  Here today for a routine follow up and to get a refill on her adderall xr 25mg. She takes once daily. NO irritability/mood swings/ticks, heart palpitations, or chest pain. Sleep is ok.   BP med recently changed to toprolol XL 50 once daily and has been under good control since being on the metoprolol. Has increased stress due to work. No depression. 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     Patient Active Problem List   Diagnosis    Seborrheic dermatitis    Attention deficit hyperactivity disorder (ADHD), predominantly inattentive type    Essential hypertension    Generalized anxiety disorder with panic attacks         Current Outpatient Medications:     dextroamphetamine-amphetamine (ADDERALL XR) 25 MG 24 hr capsule, Take 1 capsule (25 mg total) by mouth every morning., Disp: 30 capsule, Rfl: 0    [START ON 5/10/2023] dextroamphetamine-amphetamine (ADDERALL XR) 25 MG 24 hr capsule, Take 1 capsule (25 mg total) by mouth every morning., Disp: 30 capsule, Rfl: 0    dextroamphetamine-amphetamine (ADDERALL XR) 25 MG 24 hr capsule, Take 1 capsule (25 mg total) by mouth every morning., Disp: 30 capsule, Rfl: 0    hydrocortisone 2.5 % cream, Apply SPARINGLY to areas of skin irritation twice daily as needed (Use only as needed, max use 5 days.) (Patient not taking: Reported on 3/30/2023), Disp: 20 g, Rfl: 0    ketoconazole (NIZORAL) 2 % shampoo, Apply to a clean scalp and skin under facial hair twice weekly for 4 weeks. (Patient not taking: Reported on 3/30/2023), Disp: 240 mL, Rfl: 2    metoprolol succinate (TOPROL-XL) 50 MG 24 hr tablet, Take 1 tablet (50 mg total) by mouth once daily., Disp: 90 tablet, Rfl: 3    Review of Systems   Constitutional:  Negative for activity change, appetite change, chills, diaphoresis, fatigue, fever and unexpected weight change.   HENT: Negative.  Negative for congestion, hearing loss,  "postnasal drip, rhinorrhea, sore throat, trouble swallowing and voice change.    Eyes: Negative.  Negative for visual disturbance.   Respiratory: Negative.  Negative for cough, choking, chest tightness and shortness of breath.    Cardiovascular:  Negative for chest pain, palpitations and leg swelling.   Gastrointestinal:  Negative for abdominal distention, abdominal pain, blood in stool, constipation, diarrhea, nausea and vomiting.   Endocrine: Negative for cold intolerance, heat intolerance, polydipsia and polyuria.   Genitourinary: Negative.  Negative for difficulty urinating and frequency.   Musculoskeletal:  Negative for arthralgias, back pain, gait problem, joint swelling and myalgias.   Skin:  Negative for color change, pallor, rash and wound.   Neurological:  Negative for dizziness, tremors, weakness, light-headedness, numbness and headaches.   Hematological:  Negative for adenopathy.   Psychiatric/Behavioral:  Negative for behavioral problems, confusion, self-injury, sleep disturbance and suicidal ideas. The patient is not nervous/anxious.    Objective:   /78 (BP Location: Left arm, Patient Position: Sitting, BP Method: Medium (Manual))   Pulse 99   Temp 98.5 °F (36.9 °C) (Tympanic)   Ht 5' 5" (1.651 m)   Wt 65.3 kg (143 lb 15.4 oz)   LMP 02/09/2023 (Exact Date)   SpO2 97%   BMI 23.96 kg/m²     Physical Exam  Vitals reviewed.   Constitutional:       General: She is not in acute distress.     Appearance: Normal appearance. She is well-developed. She is not ill-appearing, toxic-appearing or diaphoretic.   HENT:      Head: Normocephalic and atraumatic.      Right Ear: External ear normal.      Left Ear: External ear normal.      Nose: Nose normal.   Eyes:      Conjunctiva/sclera: Conjunctivae normal.      Pupils: Pupils are equal, round, and reactive to light.   Cardiovascular:      Rate and Rhythm: Normal rate and regular rhythm.      Heart sounds: Normal heart sounds. No murmur heard.    No " friction rub. No gallop.   Pulmonary:      Effort: Pulmonary effort is normal. No respiratory distress.      Breath sounds: Normal breath sounds. No wheezing or rales.   Chest:      Chest wall: No tenderness.   Abdominal:      General: There is no distension.      Palpations: Abdomen is soft.      Tenderness: There is no abdominal tenderness.   Musculoskeletal:         General: Normal range of motion.      Cervical back: Normal range of motion and neck supple.   Lymphadenopathy:      Cervical: No cervical adenopathy.   Skin:     General: Skin is warm and dry.      Capillary Refill: Capillary refill takes less than 2 seconds.      Findings: No rash.   Neurological:      Mental Status: She is alert and oriented to person, place, and time.      Motor: No weakness.      Coordination: Coordination normal.      Gait: Gait normal.   Psychiatric:         Mood and Affect: Mood normal.         Behavior: Behavior normal.         Thought Content: Thought content normal.         Judgment: Judgment normal.       Assessment:     1. Attention deficit hyperactivity disorder (ADHD), predominantly inattentive type    2. Essential hypertension      Plan:   Attention deficit hyperactivity disorder (ADHD), predominantly inattentive type  -     dextroamphetamine-amphetamine (ADDERALL XR) 25 MG 24 hr capsule; Take 1 capsule (25 mg total) by mouth every morning.  Dispense: 30 capsule; Refill: 0  -     dextroamphetamine-amphetamine (ADDERALL XR) 25 MG 24 hr capsule; Take 1 capsule (25 mg total) by mouth every morning.  Dispense: 30 capsule; Refill: 0  -     dextroamphetamine-amphetamine (ADDERALL XR) 25 MG 24 hr capsule; Take 1 capsule (25 mg total) by mouth every morning.  Dispense: 30 capsule; Refill: 0    Essential hypertension  -     metoprolol succinate (TOPROL-XL) 50 MG 24 hr tablet; Take 1 tablet (50 mg total) by mouth once daily.  Dispense: 90 tablet; Refill: 3    - checked and appropriate.   -labs today (cmp, lipid, tsh)      Follow up in about 3 months (around 7/10/2023), or if symptoms worsen or fail to improve, for Virtual Visit.

## 2023-04-11 ENCOUNTER — PATIENT MESSAGE (OUTPATIENT)
Dept: PSYCHIATRY | Facility: CLINIC | Age: 33
End: 2023-04-11
Payer: COMMERCIAL

## 2023-06-30 ENCOUNTER — OFFICE VISIT (OUTPATIENT)
Dept: INTERNAL MEDICINE | Facility: CLINIC | Age: 33
End: 2023-06-30
Payer: COMMERCIAL

## 2023-06-30 DIAGNOSIS — I10 ESSENTIAL HYPERTENSION: Chronic | ICD-10-CM

## 2023-06-30 DIAGNOSIS — F90.0 ATTENTION DEFICIT HYPERACTIVITY DISORDER (ADHD), PREDOMINANTLY INATTENTIVE TYPE: ICD-10-CM

## 2023-06-30 PROCEDURE — 1160F PR REVIEW ALL MEDS BY PRESCRIBER/CLIN PHARMACIST DOCUMENTED: ICD-10-PCS | Mod: CPTII,95,, | Performed by: FAMILY MEDICINE

## 2023-06-30 PROCEDURE — 99214 PR OFFICE/OUTPT VISIT, EST, LEVL IV, 30-39 MIN: ICD-10-PCS | Mod: 95,,, | Performed by: FAMILY MEDICINE

## 2023-06-30 PROCEDURE — 1160F RVW MEDS BY RX/DR IN RCRD: CPT | Mod: CPTII,95,, | Performed by: FAMILY MEDICINE

## 2023-06-30 PROCEDURE — 1159F MED LIST DOCD IN RCRD: CPT | Mod: CPTII,95,, | Performed by: FAMILY MEDICINE

## 2023-06-30 PROCEDURE — 99214 OFFICE O/P EST MOD 30 MIN: CPT | Mod: 95,,, | Performed by: FAMILY MEDICINE

## 2023-06-30 PROCEDURE — 1159F PR MEDICATION LIST DOCUMENTED IN MEDICAL RECORD: ICD-10-PCS | Mod: CPTII,95,, | Performed by: FAMILY MEDICINE

## 2023-06-30 RX ORDER — METOPROLOL SUCCINATE 50 MG/1
50 TABLET, EXTENDED RELEASE ORAL DAILY
Qty: 90 TABLET | Refills: 3 | Status: SHIPPED | OUTPATIENT
Start: 2023-06-30 | End: 2024-06-29

## 2023-06-30 RX ORDER — DEXTROAMPHETAMINE SACCHARATE, AMPHETAMINE ASPARTATE MONOHYDRATE, DEXTROAMPHETAMINE SULFATE AND AMPHETAMINE SULFATE 6.25; 6.25; 6.25; 6.25 MG/1; MG/1; MG/1; MG/1
25 CAPSULE, EXTENDED RELEASE ORAL EVERY MORNING
Qty: 30 CAPSULE | Refills: 0 | Status: SHIPPED | OUTPATIENT
Start: 2023-07-28 | End: 2023-09-08 | Stop reason: SDUPTHER

## 2023-06-30 RX ORDER — DEXTROAMPHETAMINE SACCHARATE, AMPHETAMINE ASPARTATE MONOHYDRATE, DEXTROAMPHETAMINE SULFATE AND AMPHETAMINE SULFATE 6.25; 6.25; 6.25; 6.25 MG/1; MG/1; MG/1; MG/1
25 CAPSULE, EXTENDED RELEASE ORAL EVERY MORNING
Qty: 30 CAPSULE | Refills: 0 | Status: SHIPPED | OUTPATIENT
Start: 2023-08-25 | End: 2023-09-08 | Stop reason: SDUPTHER

## 2023-06-30 RX ORDER — DEXTROAMPHETAMINE SACCHARATE, AMPHETAMINE ASPARTATE MONOHYDRATE, DEXTROAMPHETAMINE SULFATE AND AMPHETAMINE SULFATE 6.25; 6.25; 6.25; 6.25 MG/1; MG/1; MG/1; MG/1
25 CAPSULE, EXTENDED RELEASE ORAL EVERY MORNING
Qty: 30 CAPSULE | Refills: 0 | Status: SHIPPED | OUTPATIENT
Start: 2023-06-30 | End: 2023-07-05 | Stop reason: SDUPTHER

## 2023-06-30 NOTE — ASSESSMENT & PLAN NOTE
This is a chronic problem, stable/improved.  BP Readings from Last 6 Encounters:   04/10/23 128/78   03/30/23 (!) 150/92   03/24/23 (!) 140/92   02/16/23 (!) 141/94   02/03/23 126/78      Last 5 Patient Entered Readings                Current 30 Day Average:   Recent Readings 4/16/2023 4/12/2023 4/9/2023 4/7/2023 4/6/2023   SBP (mmHg) 117 116 113 106 110   DBP (mmHg) 86 80 81 79 78   Pulse 82 79 72 82 58               Lab Results   Component Value Date    EGFRNORACEVR >60.0 04/10/2023    CREATININE 0.7 04/10/2023    BUN 9 04/10/2023    K 4.3 04/10/2023     04/10/2023     04/10/2023     Results for orders placed or performed during the hospital encounter of 04/10/23   SCHEDULED EKG 12-LEAD (to [facility])    Collection Time: 04/10/23 10:27 AM    Narrative    Test Reason : I10,    Vent. Rate : 065 BPM     Atrial Rate : 065 BPM     P-R Int : 170 ms          QRS Dur : 086 ms      QT Int : 408 ms       P-R-T Axes : 068 082 062 degrees     QTc Int : 424 ms    Normal sinus rhythm with sinus arrhythmia  Normal ECG  No previous ECGs available  Confirmed by Santhosh Brody MD ([id number]) on 4/10/2023 7:48:46 PM    Referred By: SHANICE WALLIS           Confirmed By:Santhosh Brody MD

## 2023-06-30 NOTE — ASSESSMENT & PLAN NOTE
This is a chronic problem, stable.   Miranda reports that current treatment (Adderall-XR) is providing satisfactory relief of attention deficit disorder symptoms and helping compensate for deficits and improve level of functioning. Miranda appears to be tolerating current treatment well, reporting no mood instability, tics, disordered sleep, rapid heart rate, palpitations or other apparent adverse effects. I have observed no behaviors to suggest inappropriate use of prescribed medications. [place] Board [employer] Pharmacy Controlled Prescription Drug Monitoring database was queried and showed no activity to suggest abuse, diversion, or other inappropriate use of prescription medications.

## 2023-06-30 NOTE — PROGRESS NOTES
"TELEMEDICINE VIRTUAL VISIT  6/30/23  7:20 AM CDT    Patient's Location: Miranda represents that they are located within the state of [place].    Subjective   CHIEF COMPLAINT: Follow-up and Medication Refill    Refer to the history and data embedded within the "Assessment & Plan" section below for the status of the conditions evaluated and managed today.    Review of Systems   Constitutional:  Negative for activity change and unexpected weight change.   HENT:  Negative for hearing loss, rhinorrhea and trouble swallowing.    Eyes:  Negative for discharge and visual disturbance.   Respiratory:  Negative for chest tightness and wheezing.    Cardiovascular:  Negative for chest pain and palpitations.   Gastrointestinal:  Negative for blood in stool, constipation, diarrhea and vomiting.   Endocrine: Negative for polydipsia and polyuria.   Genitourinary:  Negative for difficulty urinating, dysuria, hematuria and menstrual problem.   Musculoskeletal:  Negative for arthralgias, joint swelling and neck pain.   Neurological:  Negative for weakness and headaches.   Psychiatric/Behavioral:  Negative for confusion and dysphoric mood.        Objective   Physical Exam  Constitutional:       General: She is not in acute distress.     Appearance: Normal appearance. She is not ill-appearing.   Pulmonary:      Effort: Pulmonary effort is normal. No respiratory distress.   Skin:     Coloration: Skin is not jaundiced.   Neurological:      Mental Status: She is alert. Mental status is at baseline.   Psychiatric:         Mood and Affect: Mood normal.         Behavior: Behavior normal.         Thought Content: Thought content normal.        Assessment and Plan   1. Essential hypertension  Assessment & Plan:  This is a chronic problem, stable/improved.  BP Readings from Last 6 Encounters:   04/10/23 128/78   03/30/23 (!) 150/92   03/24/23 (!) 140/92   02/16/23 (!) 141/94   02/03/23 126/78      Last 5 Patient Entered Readings                " Current 30 Day Average:   Recent Readings 4/16/2023 4/12/2023 4/9/2023 4/7/2023 4/6/2023   SBP (mmHg) 117 116 113 106 110   DBP (mmHg) 86 80 81 79 78   Pulse 82 79 72 82 58                 Lab Results   Component Value Date    EGFRNORACEVR >60.0 04/10/2023    CREATININE 0.7 04/10/2023    BUN 9 04/10/2023    K 4.3 04/10/2023     04/10/2023     04/10/2023     Results for orders placed or performed during the hospital encounter of 04/10/23   SCHEDULED EKG 12-LEAD (to Muse)    Collection Time: 04/10/23 10:27 AM    Narrative    Test Reason : I10,    Vent. Rate : 065 BPM     Atrial Rate : 065 BPM     P-R Int : 170 ms          QRS Dur : 086 ms      QT Int : 408 ms       P-R-T Axes : 068 082 062 degrees     QTc Int : 424 ms    Normal sinus rhythm with sinus arrhythmia  Normal ECG  No previous ECGs available  Confirmed by Santhosh Brody MD ([id number]) on 4/10/2023 7:48:46 PM    Referred By: SHANICE WALLIS           Confirmed By:Santhosh Brody MD         Orders:  -     metoprolol succinate (TOPROL-XL) 50 MG 24 hr tablet; Take 1 tablet (50 mg total) by mouth once daily.  Dispense: 90 tablet; Refill: 3    2. Attention deficit hyperactivity disorder (ADHD), predominantly inattentive type  Overview:  For substantiating documentation, refer to chart media for document uploaded by patient 2/3/2023 at 1:21 PM: %[id number]-prod-[id number].s3.-[facility]-2.[employer].co.pdf    Assessment & Plan:  This is a chronic problem, stable.   Miranda reports that current treatment (Adderall-XR) is providing satisfactory relief of attention deficit disorder symptoms and helping compensate for deficits and improve level of functioning. Miranda appears to be tolerating current treatment well, reporting no mood instability, tics, disordered sleep, rapid heart rate, palpitations or other apparent adverse effects. I have observed no behaviors to suggest inappropriate use of prescribed medications. [place] Board of Pharmacy  "Controlled Prescription Drug Monitoring database was queried and showed no activity to suggest abuse, diversion, or other inappropriate use of prescription medications.     Orders:  -     dextroamphetamine-amphetamine (ADDERALL XR) 25 MG 24 hr capsule; Take 1 capsule (25 mg total) by mouth every morning.  Dispense: 30 capsule; Refill: 0  -     dextroamphetamine-amphetamine (ADDERALL XR) 25 MG 24 hr capsule; Take 1 capsule (25 mg total) by mouth every morning.  Dispense: 30 capsule; Refill: 0  -     dextroamphetamine-amphetamine (ADDERALL XR) 25 MG 24 hr capsule; Take 1 capsule (25 mg total) by mouth every morning.  Dispense: 30 capsule; Refill: 0    Unless noted herein, any chronic conditions are represented as and appear stable, and no other significant complaints or concerns were reported.    Follow up in about 3 months (around 9/30/2023) for virtual visit, re-evaluation.        Documentation entered by me for this encounter may have been done in part using speech-recognition technology. Although I have made an effort to ensure accuracy, "sound like" errors may exist and should be interpreted in context.   TOTAL TIME evaluating and managing this patient for this encounter was greater than or equal to 20 minutes. This time was exclusive of any separately billable procedures for this patient and exclusive of time spent treating any other patients.    Documentation entered by me for this encounter may have been done in part using speech-recognition technology. Although I have made an effort to ensure accuracy, "sound like" errors may exist and should be interpreted in context.    Each patient to whom medical services are provided by telemedicine is: (1) informed of the relationship between the physician and patient and the respective role of any other health care provider with respect to management of the patient; and (2) notified that he or she may decline to receive medical services by telemedicine and may withdraw " from such care at any time.

## 2023-07-05 ENCOUNTER — TELEPHONE (OUTPATIENT)
Dept: INTERNAL MEDICINE | Facility: CLINIC | Age: 33
End: 2023-07-05
Payer: COMMERCIAL

## 2023-07-05 DIAGNOSIS — F90.0 ATTENTION DEFICIT HYPERACTIVITY DISORDER (ADHD), PREDOMINANTLY INATTENTIVE TYPE: ICD-10-CM

## 2023-07-05 RX ORDER — DEXTROAMPHETAMINE SACCHARATE, AMPHETAMINE ASPARTATE MONOHYDRATE, DEXTROAMPHETAMINE SULFATE AND AMPHETAMINE SULFATE 5; 5; 5; 5 MG/1; MG/1; MG/1; MG/1
20 CAPSULE, EXTENDED RELEASE ORAL EVERY MORNING
Qty: 30 CAPSULE | Refills: 0 | Status: SHIPPED | OUTPATIENT
Start: 2023-07-05 | End: 2023-08-05

## 2023-07-05 NOTE — TELEPHONE ENCOUNTER
----- Message from Diane Chance sent at 7/5/2023  2:56 PM CDT -----  Contact: Miranda  .Type:  Patient Returning Call    Who Called:Miranda   Who Left Message for Patient:Humberto   Does the patient know what this is regarding?:medication   Would the patient rather a call back or a response via [facility]? call  Best Call Back Number:.[phone]  Additional Information:   Thanks  LR

## 2023-07-05 NOTE — TELEPHONE ENCOUNTER
No care due was identified.  Health [facility] Embedded Care Due Messages. Reference number: [id number].   7/05/2023 3:00:57 PM CDT

## 2023-09-08 ENCOUNTER — OFFICE VISIT (OUTPATIENT)
Dept: INTERNAL MEDICINE | Facility: CLINIC | Age: 33
End: 2023-09-08
Payer: COMMERCIAL

## 2023-09-08 DIAGNOSIS — F90.0 ATTENTION DEFICIT HYPERACTIVITY DISORDER (ADHD), PREDOMINANTLY INATTENTIVE TYPE: ICD-10-CM

## 2023-09-08 PROCEDURE — 1159F PR MEDICATION LIST DOCUMENTED IN MEDICAL RECORD: ICD-10-PCS | Mod: CPTII,95,, | Performed by: FAMILY MEDICINE

## 2023-09-08 PROCEDURE — 99213 OFFICE O/P EST LOW 20 MIN: CPT | Mod: 95,,, | Performed by: FAMILY MEDICINE

## 2023-09-08 PROCEDURE — 1160F RVW MEDS BY RX/DR IN RCRD: CPT | Mod: CPTII,95,, | Performed by: FAMILY MEDICINE

## 2023-09-08 PROCEDURE — 1159F MED LIST DOCD IN RCRD: CPT | Mod: CPTII,95,, | Performed by: FAMILY MEDICINE

## 2023-09-08 PROCEDURE — 99213 PR OFFICE/OUTPT VISIT, EST, LEVL III, 20-29 MIN: ICD-10-PCS | Mod: 95,,, | Performed by: FAMILY MEDICINE

## 2023-09-08 PROCEDURE — 1160F PR REVIEW ALL MEDS BY PRESCRIBER/CLIN PHARMACIST DOCUMENTED: ICD-10-PCS | Mod: CPTII,95,, | Performed by: FAMILY MEDICINE

## 2023-09-08 RX ORDER — DEXTROAMPHETAMINE SACCHARATE, AMPHETAMINE ASPARTATE MONOHYDRATE, DEXTROAMPHETAMINE SULFATE AND AMPHETAMINE SULFATE 6.25; 6.25; 6.25; 6.25 MG/1; MG/1; MG/1; MG/1
25 CAPSULE, EXTENDED RELEASE ORAL EVERY MORNING
Qty: 30 CAPSULE | Refills: 0 | Status: SHIPPED | OUTPATIENT
Start: 2023-09-08 | End: 2023-10-08

## 2023-09-08 RX ORDER — DEXTROAMPHETAMINE SACCHARATE, AMPHETAMINE ASPARTATE MONOHYDRATE, DEXTROAMPHETAMINE SULFATE AND AMPHETAMINE SULFATE 6.25; 6.25; 6.25; 6.25 MG/1; MG/1; MG/1; MG/1
25 CAPSULE, EXTENDED RELEASE ORAL EVERY MORNING
Qty: 30 CAPSULE | Refills: 0 | Status: SHIPPED | OUTPATIENT
Start: 2023-11-03 | End: 2023-12-05 | Stop reason: SDUPTHER

## 2023-09-08 RX ORDER — DEXTROAMPHETAMINE SACCHARATE, AMPHETAMINE ASPARTATE MONOHYDRATE, DEXTROAMPHETAMINE SULFATE AND AMPHETAMINE SULFATE 6.25; 6.25; 6.25; 6.25 MG/1; MG/1; MG/1; MG/1
25 CAPSULE, EXTENDED RELEASE ORAL EVERY MORNING
Qty: 30 CAPSULE | Refills: 0 | Status: SHIPPED | OUTPATIENT
Start: 2023-10-06 | End: 2023-11-05

## 2023-09-08 NOTE — PROGRESS NOTES
TELEMEDICINE VIRTUAL VIDEO VISIT  9/8/23  1:40 PM CDT    Visit Type: Audiovisual    Patient's Location: Miranda represents that they are located within the state of [place].    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 Miranda reports that current treatment (Adderall-XR) is providing satisfactory relief of attention deficit disorder symptoms and helping compensate for deficits and improve level of functioning. Miranda appears to be tolerating current treatment well, reporting no mood instability, tics, disordered sleep, rapid heart rate, palpitations or other apparent adverse effects. I have observed no behaviors to suggest inappropriate use of prescribed medications. [place] Board of Pharmacy Controlled Prescription Drug Monitoring database was queried and showed no activity to suggest abuse, diversion, or other inappropriate use of prescription medications.         1. Attention deficit hyperactivity disorder (ADHD), predominantly inattentive type  Overview:  For substantiating documentation, refer to chart media for document uploaded by patient 2/3/2023 at 1:21 PM: %[id number]-[id number].-[facility]-2.[employer].pdf    Orders:  -     dextroamphetamine-amphetamine (ADDERALL XR) 25 MG 24 hr capsule; Take 1 capsule (25 mg total) by mouth every morning.  Dispense: 30 capsule; Refill: 0  -     dextroamphetamine-amphetamine (ADDERALL XR) 25 MG 24 hr capsule; Take 1 capsule (25 mg total) by mouth every morning.  Dispense: 30 capsule; Refill: 0  -     dextroamphetamine-amphetamine (ADDERALL XR) 25 MG 24 hr capsule; Take 1 capsule (25 mg total) by mouth every morning.  Dispense: 30 capsule; Refill: 0    Unless noted herein, any chronic conditions are represented as and appear stable, and no other significant complaints or concerns were reported.    Review of Systems   Constitutional:   "Negative for activity change and unexpected weight change.   HENT:  Negative for hearing loss, rhinorrhea and trouble swallowing.    Eyes:  Negative for discharge and visual disturbance.   Respiratory:  Negative for chest tightness and wheezing.    Cardiovascular:  Negative for chest pain and palpitations.   Gastrointestinal:  Negative for blood in stool, constipation, diarrhea and vomiting.   Endocrine: Negative for polydipsia and polyuria.   Genitourinary:  Negative for difficulty urinating, dysuria, hematuria and menstrual problem.   Musculoskeletal:  Negative for arthralgias, joint swelling and neck pain.   Neurological:  Negative for weakness and headaches.   Psychiatric/Behavioral:  Negative for confusion and dysphoric mood.        Physical Exam  Constitutional:       General: She is not in acute distress.     Appearance: Normal appearance. She is not ill-appearing.   Pulmonary:      Effort: Pulmonary effort is normal. No respiratory distress.   Skin:     Coloration: Skin is not jaundiced.   Neurological:      Mental Status: She is alert. Mental status is at baseline.   Psychiatric:         Mood and Affect: Mood normal.         Behavior: Behavior normal.         Thought Content: Thought content normal.       Follow up in about 12 weeks (around 12/1/2023) for virtual visit, re-evaluation.     Documentation entered by me for this encounter may have been done in part using speech-recognition technology. Although I have made an effort to ensure accuracy, "sound like" errors may exist and should be interpreted in context.   TOTAL TIME evaluating and managing this patient for this encounter was greater than or equal to 12 minutes. This time was exclusive of any separately billable procedures for this patient and exclusive of time spent treating any other patient.    Documentation entered by me for this encounter may have been done in part using speech-recognition technology. Although I have made an effort to ensure " "accuracy, "sound like" errors may exist and should be interpreted in context.    Each patient to whom medical services are provided by telemedicine is: (1) informed of the relationship between the physician and patient and the respective role of any other health care provider with respect to management of the patient; and (2) notified that he or she may decline to receive medical services by telemedicine and may withdraw from such care at any time.  "

## 2023-09-08 NOTE — TELEPHONE ENCOUNTER
Refill Routing Note   Medication(s) are not appropriate for processing by [facility] for the following reason(s):      Medication outside of protocol    [facility] action(s):  Route Care Due:  None identified            Appointments  past 12m or future 3m with PCP    Date Provider   Last Visit   9/8/2023 SHANICE Gillis MD   Next Visit   12/7/2023 SHANICE Gillis MD   ED visits in past 90 days: 0        Note composed:6:31 PM 09/08/2023

## 2023-09-08 NOTE — TELEPHONE ENCOUNTER
No care due was identified.  [facility] Embedded Care Due Messages. Reference number: [id number].   9/08/2023 4:01:05 PM CDT

## 2023-09-12 RX ORDER — DEXTROAMPHETAMINE SACCHARATE, AMPHETAMINE ASPARTATE MONOHYDRATE, DEXTROAMPHETAMINE SULFATE AND AMPHETAMINE SULFATE 6.25; 6.25; 6.25; 6.25 MG/1; MG/1; MG/1; MG/1
25 CAPSULE, EXTENDED RELEASE ORAL EVERY MORNING
Qty: 30 CAPSULE | Refills: 0 | OUTPATIENT
Start: 2023-09-12 | End: 2023-10-12

## 2023-09-12 NOTE — TELEPHONE ENCOUNTER
TO MY TEAM: Please relay message below to patient, either by phone or by Patient Portal message.    Thanks.      Miranda Epps.    I received a refill request for the medicine listed below.    I did not refill the medicine because I believe this request was sent in error. According to our records, you should have prescriptions/refills remaining for this medicine.    Please talk with your pharmacist, relay my message, and ask them to fill the prescriptions they should already have on file for you. Then, tell them I said to contact me personally if there is any problem with your prescription.    If I have misunderstood the situation, and there is a reason you need a new prescription for this medicine, please respond to this message with the information I am missing.    Thanks for letting me care for you, and thanks for trusting [facility] with your healthcare needs.    All the best,    HARLEY Gillis MD  --------------------------------------------------------------------------------  Requested Prescriptions   Pending Prescriptions Disp Refills    dextroamphetamine-amphetamine (ADDERALL XR) 25 MG 24 hr capsule 30 capsule 0     Sig: Take 1 capsule (25 mg total) by mouth every morning.

## 2023-10-06 DIAGNOSIS — F90.0 ATTENTION DEFICIT HYPERACTIVITY DISORDER (ADHD), PREDOMINANTLY INATTENTIVE TYPE: ICD-10-CM

## 2023-10-06 NOTE — TELEPHONE ENCOUNTER
Refill Routing Note   Medication(s) are not appropriate for processing by [facility] for the following reason(s):      Medication outside of protocol: Adderall XR     [facility] action(s):  Route Care Due:  None identified              Appointments  past 12m or future 3m with PCP    Date Provider   Last Visit   9/8/2023 SHANICE Gillis MD   Next Visit   12/7/2023 SHANICE Gillis MD   ED visits in past 90 days: 0        Note composed:11:42 AM 10/06/2023

## 2023-10-06 NOTE — TELEPHONE ENCOUNTER
No care due was identified.  [facility] Embedded Care Due Messages. Reference number: [id number].   10/06/2023 7:11:18 AM CDT

## 2023-10-13 ENCOUNTER — PATIENT MESSAGE (OUTPATIENT)
Dept: INTERNAL MEDICINE | Facility: CLINIC | Age: 33
End: 2023-10-13
Payer: COMMERCIAL

## 2023-10-13 RX ORDER — DEXTROAMPHETAMINE SACCHARATE, AMPHETAMINE ASPARTATE MONOHYDRATE, DEXTROAMPHETAMINE SULFATE AND AMPHETAMINE SULFATE 6.25; 6.25; 6.25; 6.25 MG/1; MG/1; MG/1; MG/1
25 CAPSULE, EXTENDED RELEASE ORAL EVERY MORNING
Qty: 30 CAPSULE | Refills: 0 | OUTPATIENT
Start: 2023-10-13 | End: 2023-11-12

## 2023-10-13 NOTE — TELEPHONE ENCOUNTER
Miranda Epps.    I received a refill request for your ADD medicine listed below, but I believe that request was sent in error.    At your last appointment, I gave you three separate prescriptions for this medicine. They can be filled, one after the other, lasting a total of 90 days if you take your medicine every day. Your pharmacy ([facility]) confirmed that they received these prescriptions electronically, so you should not new a new prescription at this point.    Please get in touch with your pharmacist and ask them to fill the prescriptions they should already have on file for you.    If your pharmacist says that there is some problem preventing them from filling your prescriptions, please write me back with the name of the person you spoke with at the pharmacy and the pharmacy's phone number, and I'll see what I can do to resolve the problem.    All the best,    HARLEY Gillis MD    REFILL NOT APPROVED  REASON: Refill not needed currently.  Requested Prescriptions     Refused Prescriptions Disp Refills    dextroamphetamine-amphetamine (ADDERALL XR) 25 MG 24 hr capsule 30 capsule 0     Sig: Take 1 capsule (25 mg total) by mouth every morning.     Refused By: SHANICE GILLIS     Reason for Refusal: Refill not appropriate       Future Appointments   Date Time Provider Department Center   12/7/2023 10:20 AM SHANICE Gillis MD [facility]

## 2023-12-05 ENCOUNTER — PATIENT MESSAGE (OUTPATIENT)
Dept: INTERNAL MEDICINE | Facility: CLINIC | Age: 33
End: 2023-12-05

## 2023-12-05 ENCOUNTER — OFFICE VISIT (OUTPATIENT)
Dept: INTERNAL MEDICINE | Facility: CLINIC | Age: 33
End: 2023-12-05
Payer: COMMERCIAL

## 2023-12-05 ENCOUNTER — TELEPHONE (OUTPATIENT)
Dept: INTERNAL MEDICINE | Facility: CLINIC | Age: 33
End: 2023-12-05
Payer: COMMERCIAL

## 2023-12-05 DIAGNOSIS — F90.0 ATTENTION DEFICIT HYPERACTIVITY DISORDER (ADHD), PREDOMINANTLY INATTENTIVE TYPE: Primary | Chronic | ICD-10-CM

## 2023-12-05 PROCEDURE — 99213 PR OFFICE/OUTPT VISIT, EST, LEVL III, 20-29 MIN: ICD-10-PCS | Mod: 95,,, | Performed by: FAMILY MEDICINE

## 2023-12-05 PROCEDURE — 1159F MED LIST DOCD IN RCRD: CPT | Mod: CPTII,95,, | Performed by: FAMILY MEDICINE

## 2023-12-05 PROCEDURE — 1160F RVW MEDS BY RX/DR IN RCRD: CPT | Mod: CPTII,95,, | Performed by: FAMILY MEDICINE

## 2023-12-05 PROCEDURE — 1159F PR MEDICATION LIST DOCUMENTED IN MEDICAL RECORD: ICD-10-PCS | Mod: CPTII,95,, | Performed by: FAMILY MEDICINE

## 2023-12-05 PROCEDURE — 99213 OFFICE O/P EST LOW 20 MIN: CPT | Mod: 95,,, | Performed by: FAMILY MEDICINE

## 2023-12-05 PROCEDURE — 1160F PR REVIEW ALL MEDS BY PRESCRIBER/CLIN PHARMACIST DOCUMENTED: ICD-10-PCS | Mod: CPTII,95,, | Performed by: FAMILY MEDICINE

## 2023-12-05 RX ORDER — DEXTROAMPHETAMINE SACCHARATE, AMPHETAMINE ASPARTATE MONOHYDRATE, DEXTROAMPHETAMINE SULFATE AND AMPHETAMINE SULFATE 6.25; 6.25; 6.25; 6.25 MG/1; MG/1; MG/1; MG/1
25 CAPSULE, EXTENDED RELEASE ORAL EVERY MORNING
Qty: 30 CAPSULE | Refills: 0 | Status: SHIPPED | OUTPATIENT
Start: 2024-01-30 | End: 2024-03-11 | Stop reason: SDUPTHER

## 2023-12-05 RX ORDER — DEXTROAMPHETAMINE SACCHARATE, AMPHETAMINE ASPARTATE MONOHYDRATE, DEXTROAMPHETAMINE SULFATE AND AMPHETAMINE SULFATE 6.25; 6.25; 6.25; 6.25 MG/1; MG/1; MG/1; MG/1
25 CAPSULE, EXTENDED RELEASE ORAL EVERY MORNING
Qty: 30 CAPSULE | Refills: 0 | Status: SHIPPED | OUTPATIENT
Start: 2024-01-02 | End: 2024-02-01

## 2023-12-05 RX ORDER — DEXTROAMPHETAMINE SACCHARATE, AMPHETAMINE ASPARTATE MONOHYDRATE, DEXTROAMPHETAMINE SULFATE AND AMPHETAMINE SULFATE 6.25; 6.25; 6.25; 6.25 MG/1; MG/1; MG/1; MG/1
25 CAPSULE, EXTENDED RELEASE ORAL EVERY MORNING
Qty: 30 CAPSULE | Refills: 0 | Status: SHIPPED | OUTPATIENT
Start: 2023-12-05 | End: 2024-01-06

## 2023-12-05 NOTE — PROGRESS NOTES
TELEMEDICINE VIRTUAL VIDEO VISIT  12/5/23  1:00 PM CST    Visit Type: Audiovisual    Patient's Location: Miranda represents that they are located within the state of [place].    CHIEF COMPLAINT: Follow-up, Refill    HPI    1. Attention deficit hyperactivity disorder (ADHD), predominantly inattentive type  Overview:  For substantiating documentation, refer to chart media for document uploaded by patient 2/3/2023 at 1:21 PM: %2Fado-api-prod-[id number].-[facility]-2.[employer].co.pdf    Assessment & Plan:  Miranda reports that current treatment (Adderall-XR) is providing satisfactory relief of attention deficit disorder symptoms and helping compensate for deficits and improve level of functioning. Miranda appears to be tolerating current treatment well, reporting no mood instability, tics, disordered sleep, rapid heart rate, palpitations or other apparent adverse effects. I have observed no behaviors to suggest inappropriate use of prescribed medications. [facility] Board of Pharmacy Controlled Prescription Drug Monitoring database was queried and showed no activity to suggest abuse, diversion, or other inappropriate use of prescription medications.     Orders:  -     dextroamphetamine-amphetamine (ADDERALL XR) 25 MG 24 hr capsule; Take 1 capsule (25 mg total) by mouth every morning.  Dispense: 30 capsule; Refill: 0  -     dextroamphetamine-amphetamine (ADDERALL XR) 25 MG 24 hr capsule; Take 1 capsule (25 mg total) by mouth every morning.  Dispense: 30 capsule; Refill: 0  -     dextroamphetamine-amphetamine (ADDERALL XR) 25 MG 24 hr capsule; Take 1 capsule (25 mg total) by mouth every morning.  Dispense: 30 capsule; Refill: 0    Unless noted herein, any chronic conditions are represented as and appear stable, and no other significant complaints or concerns were reported.    Review of Systems   Constitutional:  Negative for activity change and unexpected weight change.   HENT:  Negative for  "hearing loss, rhinorrhea and trouble swallowing.    Eyes:  Negative for discharge and visual disturbance.   Respiratory:  Negative for chest tightness and wheezing.    Cardiovascular:  Negative for chest pain and palpitations.   Gastrointestinal:  Negative for blood in stool, constipation, diarrhea and vomiting.   Endocrine: Negative for polydipsia and polyuria.   Genitourinary:  Negative for difficulty urinating, dysuria, hematuria and menstrual problem.   Musculoskeletal:  Negative for arthralgias, joint swelling and neck pain.   Neurological:  Negative for weakness and headaches.   Psychiatric/Behavioral:  Negative for confusion and dysphoric mood.        Physical Exam  Constitutional:       General: She is not in acute distress.     Appearance: Normal appearance. She is not ill-appearing.   Pulmonary:      Effort: Pulmonary effort is normal. No respiratory distress.   Skin:     Coloration: Skin is not jaundiced.   Neurological:      Mental Status: She is alert. Mental status is at baseline.   Psychiatric:         Mood and Affect: Mood normal.         Behavior: Behavior normal.         Thought Content: Thought content normal.       Follow up in about 3 months (around 3/5/2024).     Documentation entered by me for this encounter may have been done in part using speech-recognition technology. Although I have made an effort to ensure accuracy, "sound like" errors may exist and should be interpreted in context.   TOTAL TIME evaluating and managing this patient for this encounter was greater than or equal to 20 minutes.  This time was exclusive of any separately billable procedures for this patient and exclusive of time spent treating any other patient.    Documentation entered by me for this encounter may have been done in part using speech-recognition technology. Although I have made an effort to ensure accuracy, "sound like" errors may exist and should be interpreted in context.    Each patient to whom medical " services are provided by telemedicine is: (1) informed of the relationship between the physician and patient and the respective role of any other health care provider with respect to management of the patient; and (2) notified that he or she may decline to receive medical services by telemedicine and may withdraw from such care at any time.

## 2023-12-05 NOTE — LETTER
SHANICE Gillis MD  [address]  [place] [address]  [phone]                  DATE READY: 2023  HOLD FOR PATIENT TO   Miranda Garcia  : 1990        --------------------------------------------------------------

## 2023-12-05 NOTE — ASSESSMENT & PLAN NOTE
Miranda reports that current treatment (Adderall-XR) is providing satisfactory relief of attention deficit disorder symptoms and helping compensate for deficits and improve level of functioning. Miranda appears to be tolerating current treatment well, reporting no mood instability, tics, disordered sleep, rapid heart rate, palpitations or other apparent adverse effects. I have observed no behaviors to suggest inappropriate use of prescribed medications. [facility] database was queried and showed no activity to suggest abuse, diversion, or other inappropriate use of prescription medications.

## 2024-03-07 ENCOUNTER — TELEPHONE (OUTPATIENT)
Dept: INTERNAL MEDICINE | Facility: CLINIC | Age: 34
End: 2024-03-07
Payer: COMMERCIAL

## 2024-03-07 ENCOUNTER — PATIENT MESSAGE (OUTPATIENT)
Dept: INTERNAL MEDICINE | Facility: CLINIC | Age: 34
End: 2024-03-07
Payer: COMMERCIAL

## 2024-03-07 NOTE — TELEPHONE ENCOUNTER
Tried calling pt no answer VM was left informing pt that her PCP will not be in clinic due to a family emergency.

## 2024-03-11 ENCOUNTER — OFFICE VISIT (OUTPATIENT)
Dept: INTERNAL MEDICINE | Facility: CLINIC | Age: 34
End: 2024-03-11
Payer: COMMERCIAL

## 2024-03-11 ENCOUNTER — PATIENT MESSAGE (OUTPATIENT)
Dept: INTERNAL MEDICINE | Facility: CLINIC | Age: 34
End: 2024-03-11

## 2024-03-11 ENCOUNTER — LAB VISIT (OUTPATIENT)
Dept: LAB | Facility: HOSPITAL | Age: 34
End: 2024-03-11
Attending: NURSE PRACTITIONER
Payer: COMMERCIAL

## 2024-03-11 VITALS
RESPIRATION RATE: 17 BRPM | OXYGEN SATURATION: 99 % | TEMPERATURE: 97 F | HEART RATE: 73 BPM | DIASTOLIC BLOOD PRESSURE: 84 MMHG | SYSTOLIC BLOOD PRESSURE: 136 MMHG | WEIGHT: 150.13 LBS | BODY MASS INDEX: 24.98 KG/M2

## 2024-03-11 DIAGNOSIS — Z00.00 ANNUAL PHYSICAL EXAM: Primary | ICD-10-CM

## 2024-03-11 DIAGNOSIS — L21.9 SEBORRHEIC DERMATITIS: ICD-10-CM

## 2024-03-11 DIAGNOSIS — R21 RASH: ICD-10-CM

## 2024-03-11 DIAGNOSIS — Z00.00 ANNUAL PHYSICAL EXAM: ICD-10-CM

## 2024-03-11 DIAGNOSIS — I10 ESSENTIAL HYPERTENSION: Chronic | ICD-10-CM

## 2024-03-11 DIAGNOSIS — F41.0 GENERALIZED ANXIETY DISORDER WITH PANIC ATTACKS: Chronic | ICD-10-CM

## 2024-03-11 DIAGNOSIS — F90.0 ATTENTION DEFICIT HYPERACTIVITY DISORDER (ADHD), PREDOMINANTLY INATTENTIVE TYPE: Chronic | ICD-10-CM

## 2024-03-11 DIAGNOSIS — F41.1 GENERALIZED ANXIETY DISORDER WITH PANIC ATTACKS: Chronic | ICD-10-CM

## 2024-03-11 LAB
ALBUMIN SERPL BCP-MCNC: 4.2 G/DL (ref 3.5–5.2)
ALP SERPL-CCNC: 60 U/L (ref 55–135)
ALT SERPL W/O P-5'-P-CCNC: 17 U/L (ref 10–44)
ANION GAP SERPL CALC-SCNC: 11 MMOL/L (ref 8–16)
AST SERPL-CCNC: 17 U/L (ref 10–40)
BASOPHILS # BLD AUTO: 0.09 K/UL (ref 0–0.2)
BASOPHILS NFR BLD: 0.8 % (ref 0–1.9)
BILIRUB SERPL-MCNC: 1.2 MG/DL (ref 0.1–1)
BUN SERPL-MCNC: 9 MG/DL (ref 6–20)
CALCIUM SERPL-MCNC: 10.1 MG/DL (ref 8.7–10.5)
CHLORIDE SERPL-SCNC: 104 MMOL/L (ref 95–110)
CHOLEST SERPL-MCNC: 210 MG/DL (ref 120–199)
CHOLEST/HDLC SERPL: 2.8 {RATIO} (ref 2–5)
CO2 SERPL-SCNC: 24 MMOL/L (ref 23–29)
CREAT SERPL-MCNC: 0.6 MG/DL (ref 0.5–1.4)
DIFFERENTIAL METHOD BLD: ABNORMAL
EOSINOPHIL # BLD AUTO: 0.2 K/UL (ref 0–0.5)
EOSINOPHIL NFR BLD: 2.2 % (ref 0–8)
ERYTHROCYTE [DISTWIDTH] IN BLOOD BY AUTOMATED COUNT: 12.2 % (ref 11.5–14.5)
EST. GFR  (NO RACE VARIABLE): >60 ML/MIN/1.73 M^2
ESTIMATED AVG GLUCOSE: 100 MG/DL (ref 68–131)
GLUCOSE SERPL-MCNC: 94 MG/DL (ref 70–110)
HBA1C MFR BLD: 5.1 % (ref 4–5.6)
HCT VFR BLD AUTO: 43.6 % (ref 37–48.5)
HDLC SERPL-MCNC: 75 MG/DL (ref 40–75)
HDLC SERPL: 35.7 % (ref 20–50)
HGB BLD-MCNC: 15 G/DL (ref 12–16)
IMM GRANULOCYTES # BLD AUTO: 0.02 K/UL (ref 0–0.04)
IMM GRANULOCYTES NFR BLD AUTO: 0.2 % (ref 0–0.5)
LDLC SERPL CALC-MCNC: 113.2 MG/DL (ref 63–159)
LYMPHOCYTES # BLD AUTO: 2.8 K/UL (ref 1–4.8)
LYMPHOCYTES NFR BLD: 25.6 % (ref 18–48)
MCH RBC QN AUTO: 31.7 PG (ref 27–31)
MCHC RBC AUTO-ENTMCNC: 34.4 G/DL (ref 32–36)
MCV RBC AUTO: 92 FL (ref 82–98)
MONOCYTES # BLD AUTO: 1 K/UL (ref 0.3–1)
MONOCYTES NFR BLD: 9 % (ref 4–15)
NEUTROPHILS # BLD AUTO: 6.8 K/UL (ref 1.8–7.7)
NEUTROPHILS NFR BLD: 62.2 % (ref 38–73)
NONHDLC SERPL-MCNC: 135 MG/DL
NRBC BLD-RTO: 0 /100 WBC
PLATELET # BLD AUTO: 392 K/UL (ref 150–450)
PMV BLD AUTO: 10.3 FL (ref 9.2–12.9)
POTASSIUM SERPL-SCNC: 4.3 MMOL/L (ref 3.5–5.1)
PROT SERPL-MCNC: 7.2 G/DL (ref 6–8.4)
RBC # BLD AUTO: 4.73 M/UL (ref 4–5.4)
SODIUM SERPL-SCNC: 139 MMOL/L (ref 136–145)
TRIGL SERPL-MCNC: 109 MG/DL (ref 30–150)
TSH SERPL DL<=0.005 MIU/L-ACNC: 1.88 UIU/ML (ref 0.4–4)
WBC # BLD AUTO: 10.88 K/UL (ref 3.9–12.7)

## 2024-03-11 PROCEDURE — 85025 COMPLETE CBC W/AUTO DIFF WBC: CPT | Performed by: NURSE PRACTITIONER

## 2024-03-11 PROCEDURE — 80053 COMPREHEN METABOLIC PANEL: CPT | Performed by: NURSE PRACTITIONER

## 2024-03-11 PROCEDURE — 36415 COLL VENOUS BLD VENIPUNCTURE: CPT | Performed by: NURSE PRACTITIONER

## 2024-03-11 PROCEDURE — 3075F SYST BP GE 130 - 139MM HG: CPT | Mod: CPTII,S$GLB,, | Performed by: NURSE PRACTITIONER

## 2024-03-11 PROCEDURE — 1159F MED LIST DOCD IN RCRD: CPT | Mod: CPTII,S$GLB,, | Performed by: NURSE PRACTITIONER

## 2024-03-11 PROCEDURE — 3079F DIAST BP 80-89 MM HG: CPT | Mod: CPTII,S$GLB,, | Performed by: NURSE PRACTITIONER

## 2024-03-11 PROCEDURE — 84443 ASSAY THYROID STIM HORMONE: CPT | Performed by: NURSE PRACTITIONER

## 2024-03-11 PROCEDURE — 83036 HEMOGLOBIN GLYCOSYLATED A1C: CPT | Performed by: NURSE PRACTITIONER

## 2024-03-11 PROCEDURE — 99214 OFFICE O/P EST MOD 30 MIN: CPT | Mod: S$GLB,,, | Performed by: NURSE PRACTITIONER

## 2024-03-11 PROCEDURE — 99999 PR PBB SHADOW E&M-EST. PATIENT-LVL III: CPT | Mod: PBBFAC,,, | Performed by: NURSE PRACTITIONER

## 2024-03-11 PROCEDURE — 80061 LIPID PANEL: CPT | Performed by: NURSE PRACTITIONER

## 2024-03-11 PROCEDURE — 3008F BODY MASS INDEX DOCD: CPT | Mod: CPTII,S$GLB,, | Performed by: NURSE PRACTITIONER

## 2024-03-11 RX ORDER — DEXTROAMPHETAMINE SACCHARATE, AMPHETAMINE ASPARTATE MONOHYDRATE, DEXTROAMPHETAMINE SULFATE AND AMPHETAMINE SULFATE 6.25; 6.25; 6.25; 6.25 MG/1; MG/1; MG/1; MG/1
25 CAPSULE, EXTENDED RELEASE ORAL EVERY MORNING
Qty: 30 CAPSULE | Refills: 0 | Status: SHIPPED | OUTPATIENT
Start: 2024-04-08 | End: 2024-05-08

## 2024-03-11 RX ORDER — TRIAMCINOLONE ACETONIDE 1 MG/G
CREAM TOPICAL 2 TIMES DAILY
Qty: 30 G | Refills: 0 | Status: SHIPPED | OUTPATIENT
Start: 2024-03-11 | End: 2024-05-31

## 2024-03-11 RX ORDER — DEXTROAMPHETAMINE SACCHARATE, AMPHETAMINE ASPARTATE MONOHYDRATE, DEXTROAMPHETAMINE SULFATE AND AMPHETAMINE SULFATE 6.25; 6.25; 6.25; 6.25 MG/1; MG/1; MG/1; MG/1
25 CAPSULE, EXTENDED RELEASE ORAL EVERY MORNING
Qty: 30 CAPSULE | Refills: 0 | Status: SHIPPED | OUTPATIENT
Start: 2024-05-06 | End: 2024-05-31 | Stop reason: SDUPTHER

## 2024-03-11 RX ORDER — DEXTROAMPHETAMINE SACCHARATE, AMPHETAMINE ASPARTATE MONOHYDRATE, DEXTROAMPHETAMINE SULFATE AND AMPHETAMINE SULFATE 6.25; 6.25; 6.25; 6.25 MG/1; MG/1; MG/1; MG/1
25 CAPSULE, EXTENDED RELEASE ORAL EVERY MORNING
Qty: 30 CAPSULE | Refills: 0 | Status: SHIPPED | OUTPATIENT
Start: 2024-03-11 | End: 2024-04-10

## 2024-03-11 NOTE — PROGRESS NOTES
Subjective:       Patient ID: Miranda Garcia is a 33 y.o. female.    Chief Complaint: Annual Exam (Rash on abdominal area and refills on Ni)    HPI    ADHD- stable and controlled on adderall at current dose. She denies cp, palpitations, HA, abd pain, tics, unintentional weight loss , or any other acute issues     Rash to abd - itchy. Hx of eczema, has been ongoing intermittently x 1 mo .       Due for annual labs       Past Medical History:   Diagnosis Date    ADHD (attention deficit hyperactivity disorder)     Epilepsy, unspecified, not intractable, with status epilepticus      Past Surgical History:   Procedure Laterality Date    TONSILLECTOMY       Social History     Socioeconomic History    Marital status:    Tobacco Use    Smoking status: Never    Smokeless tobacco: Never   Substance and Sexual Activity    Alcohol use: Yes     Alcohol/week: 4.0 standard drinks of alcohol     Types: 3 Glasses of wine, 1 Cans of beer per week    Drug use: Not Currently    Sexual activity: Yes     Partners: Male     Birth control/protection: Condom     Social Determinants of Health     Financial Resource Strain: Low Risk  (3/10/2024)    Overall Financial Resource Strain (CARDIA)     Difficulty of Paying Living Expenses: Not hard at all   Food Insecurity: No Food Insecurity (3/10/2024)    Hunger Vital Sign     Worried About Running Out of Food in the Last Year: Never true     Ran Out of Food in the Last Year: Never true   Transportation Needs: No Transportation Needs (3/10/2024)    PRAPARE - Transportation     Lack of Transportation (Medical): No     Lack of Transportation (Non-Medical): No   Physical Activity: Insufficiently Active (3/10/2024)    Exercise Vital Sign     Days of Exercise per Week: 2 days     Minutes of Exercise per Session: 30 min   Stress: No Stress Concern Present (3/10/2024)    [employer] - Occupational Stress Questionnaire     Feeling of Stress : Only a little   Social  Connections: Unknown (3/10/2024)    Social Connection and Isolation Panel [NHANES]     Frequency of Communication with Friends and Family: Three times a week     Frequency of Social Gatherings with Friends and Family: Patient declined     Active Member of Clubs or Organizations: No     Attends Club or Organization Meetings: Never     Marital Status:    Housing Stability: Low Risk  (3/10/2024)    Housing Stability Vital Sign     Unable to Pay for Housing in the Last Year: No     Number of Places Lived in the Last Year: 2     Unstable Housing in the Last Year: No     Review of patient's allergies indicates:   Allergen Reactions    Penicillins Rash, Shortness Of Breath and Swelling     Current Outpatient Medications   Medication Sig    hydrocortisone 2.5 % cream Apply SPARINGLY to areas of skin irritation twice daily as needed (Use only as needed, max use 5 days.)    ketoconazole (NIZORAL) 2 % shampoo Apply to a clean scalp and skin under facial hair twice weekly for 4 weeks.    metoprolol succinate (TOPROL-XL) 50 MG 24 hr tablet Take 1 tablet (50 mg total) by mouth once daily.    dextroamphetamine-amphetamine (ADDERALL XR) 25 MG 24 hr capsule Take 1 capsule (25 mg total) by mouth every morning.    [START ON 4/8/2024] dextroamphetamine-amphetamine (ADDERALL XR) 25 MG 24 hr capsule Take 1 capsule (25 mg total) by mouth every morning.    [START ON 5/6/2024] dextroamphetamine-amphetamine (ADDERALL XR) 25 MG 24 hr capsule Take 1 capsule (25 mg total) by mouth every morning.    triamcinolone acetonide 0.1% (KENALOG) 0.1 % cream Apply topically 2 (two) times daily. Apply to abdominal rash     No current facility-administered medications for this visit.           Review of Systems   Constitutional:  Negative for activity change, appetite change, chills, diaphoresis, fatigue, fever and unexpected weight change.   HENT:  Negative for congestion, ear pain, postnasal drip, rhinorrhea, sinus pressure, sinus pain, sneezing,  sore throat, tinnitus, trouble swallowing and voice change.    Eyes:  Negative for photophobia, pain and visual disturbance.   Respiratory:  Negative for cough, chest tightness, shortness of breath and wheezing.    Cardiovascular:  Negative for chest pain, palpitations and leg swelling.   Gastrointestinal:  Negative for abdominal distention, abdominal pain, constipation, diarrhea, nausea and vomiting.   Genitourinary:  Negative for decreased urine volume, difficulty urinating, dysuria, flank pain, frequency, hematuria and urgency.   Musculoskeletal:  Negative for arthralgias, back pain, joint swelling, neck pain and neck stiffness.   Allergic/Immunologic: Negative for immunocompromised state.   Neurological:  Negative for dizziness, tremors, seizures, syncope, facial asymmetry, speech difficulty, weakness, light-headedness, numbness and headaches.   Hematological:  Negative for adenopathy. Does not bruise/bleed easily.   Psychiatric/Behavioral:  Negative for confusion and sleep disturbance.        Objective:      Physical Exam  HENT:      Head: Normocephalic and atraumatic.      Right Ear: Tympanic membrane normal.      Left Ear: Tympanic membrane normal.   Eyes:      Conjunctiva/sclera: Conjunctivae normal.   Cardiovascular:      Rate and Rhythm: Normal rate and regular rhythm.      Heart sounds: Normal heart sounds.   Pulmonary:      Effort: Pulmonary effort is normal.      Breath sounds: Normal breath sounds.   Abdominal:      General: Bowel sounds are normal.      Palpations: Abdomen is soft.   Musculoskeletal:         General: Normal range of motion.      Cervical back: Normal range of motion and neck supple.   Skin:     General: Skin is warm and dry.      Comments: Eczematous rash to abdomen.    Neurological:      Mental Status: She is alert and oriented to person, place, and time.         Assessment:     Vitals:    03/11/24 0822   BP: 136/84   Pulse: 73   Resp: 17   Temp: 97.1 °F (36.2 °C)         1. Annual  physical exam    2. Attention deficit hyperactivity disorder (ADHD), predominantly inattentive type    3. Generalized anxiety disorder with panic attacks    4. Essential hypertension        Plan:   Annual physical exam    Attention deficit hyperactivity disorder (ADHD), predominantly inattentive type  -     dextroamphetamine-amphetamine (ADDERALL XR) 25 MG 24 hr capsule; Take 1 capsule (25 mg total) by mouth every morning.  Dispense: 30 capsule; Refill: 0  -     dextroamphetamine-amphetamine (ADDERALL XR) 25 MG 24 hr capsule; Take 1 capsule (25 mg total) by mouth every morning.  Dispense: 30 capsule; Refill: 0  -     dextroamphetamine-amphetamine (ADDERALL XR) 25 MG 24 hr capsule; Take 1 capsule (25 mg total) by mouth every morning.  Dispense: 30 capsule; Refill: 0    Generalized anxiety disorder with panic attacks    Essential hypertension    Kenalog for rash PRN, do not apply to face  Continue adderall as prescribed  reviewed  PCP in 3 mo

## 2024-03-11 NOTE — TELEPHONE ENCOUNTER
Refill Routing Note   Medication(s) are not appropriate for processing by [facility] for the following reason(s):        Outside of protocol    [facility] action(s):  Route               Appointments  past 12m or future 3m with PCP    Date Provider   Last Visit   12/5/2023 SHANICE Gillis MD   Next Visit   6/11/2024 SHANICE Gillis MD   ED visits in past 90 days: 0        Note composed:9:37 AM 03/11/2024

## 2024-03-11 NOTE — ASSESSMENT & PLAN NOTE
REPORTED TO ME: Miranda reports that current treatment (Adderall-XR) is providing satisfactory relief of attention deficit disorder symptoms and helping compensate for deficits and improve level of functioning. Miranda appears to be tolerating current treatment well, reporting no mood instability, tics, disordered sleep, rapid heart rate, palpitations or other apparent adverse effects.  [place] Board [place] Pharmacy Controlled Prescription Drug Monitoring database was queried and showed no activity to suggest abuse, diversion, or other improper use of prescription medications.

## 2024-03-12 RX ORDER — KETOCONAZOLE 20 MG/ML
SHAMPOO, SUSPENSION TOPICAL
Qty: 240 ML | Refills: 2 | Status: SHIPPED | OUTPATIENT
Start: 2024-03-12

## 2024-03-12 RX ORDER — HYDROCORTISONE 25 MG/G
CREAM TOPICAL
Qty: 20 G | Refills: 0 | Status: SHIPPED | OUTPATIENT
Start: 2024-03-12

## 2024-03-12 NOTE — TELEPHONE ENCOUNTER
REFILL REQUEST APPROVED.  Requested Prescriptions   Pending Prescriptions Disp Refills    ketoconazole (NIZORAL) 2 % shampoo 240 mL 2     Sig: Apply to a clean scalp and skin under facial hair twice weekly for 4 weeks.    hydrocortisone 2.5 % cream 20 g 0     Sig: Apply SPARINGLY to areas of skin irritation twice daily as needed (Use only as needed, max use 5 days.)

## 2024-03-29 ENCOUNTER — PATIENT MESSAGE (OUTPATIENT)
Dept: INTERNAL MEDICINE | Facility: CLINIC | Age: 34
End: 2024-03-29
Payer: COMMERCIAL

## 2024-05-31 ENCOUNTER — OFFICE VISIT (OUTPATIENT)
Dept: INTERNAL MEDICINE | Facility: CLINIC | Age: 34
End: 2024-05-31
Payer: COMMERCIAL

## 2024-05-31 VITALS — SYSTOLIC BLOOD PRESSURE: 135 MMHG | DIASTOLIC BLOOD PRESSURE: 78 MMHG

## 2024-05-31 DIAGNOSIS — I10 ESSENTIAL HYPERTENSION: Primary | Chronic | ICD-10-CM

## 2024-05-31 DIAGNOSIS — L21.9 SEBORRHEIC DERMATITIS: Chronic | ICD-10-CM

## 2024-05-31 DIAGNOSIS — F90.0 ATTENTION DEFICIT HYPERACTIVITY DISORDER (ADHD), PREDOMINANTLY INATTENTIVE TYPE: Chronic | ICD-10-CM

## 2024-05-31 PROCEDURE — 3075F SYST BP GE 130 - 139MM HG: CPT | Mod: CPTII,95,, | Performed by: FAMILY MEDICINE

## 2024-05-31 PROCEDURE — 3078F DIAST BP <80 MM HG: CPT | Mod: CPTII,95,, | Performed by: FAMILY MEDICINE

## 2024-05-31 PROCEDURE — 1160F RVW MEDS BY RX/DR IN RCRD: CPT | Mod: CPTII,95,, | Performed by: FAMILY MEDICINE

## 2024-05-31 PROCEDURE — 3044F HG A1C LEVEL LT 7.0%: CPT | Mod: CPTII,95,, | Performed by: FAMILY MEDICINE

## 2024-05-31 PROCEDURE — G2211 COMPLEX E/M VISIT ADD ON: HCPCS | Mod: 95,,, | Performed by: FAMILY MEDICINE

## 2024-05-31 PROCEDURE — 99214 OFFICE O/P EST MOD 30 MIN: CPT | Mod: 95,,, | Performed by: FAMILY MEDICINE

## 2024-05-31 PROCEDURE — 1159F MED LIST DOCD IN RCRD: CPT | Mod: CPTII,95,, | Performed by: FAMILY MEDICINE

## 2024-05-31 RX ORDER — DEXTROAMPHETAMINE SACCHARATE, AMPHETAMINE ASPARTATE MONOHYDRATE, DEXTROAMPHETAMINE SULFATE AND AMPHETAMINE SULFATE 6.25; 6.25; 6.25; 6.25 MG/1; MG/1; MG/1; MG/1
25 CAPSULE, EXTENDED RELEASE ORAL EVERY MORNING
Qty: 30 CAPSULE | Refills: 0 | Status: SHIPPED | OUTPATIENT
Start: 2024-07-26 | End: 2024-08-25

## 2024-05-31 RX ORDER — DEXTROAMPHETAMINE SACCHARATE, AMPHETAMINE ASPARTATE MONOHYDRATE, DEXTROAMPHETAMINE SULFATE AND AMPHETAMINE SULFATE 6.25; 6.25; 6.25; 6.25 MG/1; MG/1; MG/1; MG/1
25 CAPSULE, EXTENDED RELEASE ORAL EVERY MORNING
Qty: 30 CAPSULE | Refills: 0 | Status: SHIPPED | OUTPATIENT
Start: 2024-05-31 | End: 2024-06-30

## 2024-05-31 RX ORDER — DEXTROAMPHETAMINE SACCHARATE, AMPHETAMINE ASPARTATE MONOHYDRATE, DEXTROAMPHETAMINE SULFATE AND AMPHETAMINE SULFATE 6.25; 6.25; 6.25; 6.25 MG/1; MG/1; MG/1; MG/1
25 CAPSULE, EXTENDED RELEASE ORAL EVERY MORNING
Qty: 30 CAPSULE | Refills: 0 | Status: SHIPPED | OUTPATIENT
Start: 2024-06-28 | End: 2024-07-28

## 2024-05-31 NOTE — PROGRESS NOTES
22[facility] VIRTUAL VIDEO VISIT  5/31/24  1:00 PM CDT    Visit Type: Audiovisual    Patient's Location: Miranda represents that they are located within the state of [place].    History of Present Illness    Miranda presents today for a follow-up.    Her hypertension is well-managed without the use of metoprolol succinate. Her blood pressure readings have been consistently under 140, with a recent reading of approximately 135 over 78. She discontinued the use of metoprolol succinate after her blood pressure stabilized and her anxiety attacks ceased.    She denies a recent flare-up of her dermatitis. She was previously prescribed hydrocortisone cream for seborrheic dermatitis on her face and triamcinolone for an abdominal rash, both of which have been effective.    She is currently taking Adderall XR 25 mg and reports tolerating it well.       Review of Systems   Constitutional:  Negative for activity change and unexpected weight change.   HENT:  Negative for hearing loss, rhinorrhea and trouble swallowing.    Eyes:  Negative for discharge and visual disturbance.   Respiratory:  Negative for chest tightness and wheezing.    Cardiovascular:  Negative for chest pain and palpitations.   Gastrointestinal:  Negative for blood in stool, constipation, diarrhea and vomiting.   Endocrine: Negative for polydipsia and polyuria.   Genitourinary:  Negative for difficulty urinating, dysuria, hematuria and menstrual problem.   Musculoskeletal:  Negative for arthralgias, joint swelling and neck pain.   Neurological:  Negative for weakness and headaches.   Psychiatric/Behavioral:  Negative for confusion and dysphoric mood.        Assessment & Plan     Removed metoprolol succinate from medication list due to adequate control of hypertension without it.   Discontinued the prescription for triamcinolone as the patient's abdominal rash has resolved.   Prescribed three serial prescriptions of Adderall XR 25 mg, each with a quantity of  30, to be filled every 28 days.   Advised the patient to fill these prescriptions in a timely manner and maintain an emergency supply in case of potential supply chain shortages.   Instructed the patient to monitor their blood pressure regularly.   Recommend discussing treatment options if blood pressure readings consistently exceed 140.   Scheduled a virtual visit for the patient in 3 months, approximately at the end of August.   Arranged another follow-up for mid to late November.   Advised the patient to schedule an in-office appointment in early January during their winter break.   Educated the patient on the importance of blood pressure monitoring and the potential need for treatment adjustments if readings consistently exceed 140.   Provided guidance on managing prescription refills, particularly when traveling abroad.       1. Essential hypertension    2. Attention deficit hyperactivity disorder (ADHD), predominantly inattentive type  Overview:  For substantiating documentation, refer to [employer] for document uploaded by patient 2/3/2023 at 1:21 PM: [id number]    Orders:  -     dextroamphetamine-amphetamine (ADDERALL XR) 25 MG 24 hr capsule; Take 1 capsule (25 mg total) by mouth every morning.  Dispense: 30 capsule; Refill: 0  -     dextroamphetamine-amphetamine (ADDERALL XR) 25 MG 24 hr capsule; Take 1 capsule (25 mg total) by mouth every morning.  Dispense: 30 capsule; Refill: 0  -     dextroamphetamine-amphetamine (ADDERALL XR) 25 MG 24 hr capsule; Take 1 capsule (25 mg total) by mouth every morning.  Dispense: 30 capsule; Refill: 0    3. Seborrheic dermatitis    No other significant complaints or concerns were reported.  Today's visit involved the intricate management of episodic problem(s) and the ongoing care for the patient's serious or complex condition(s) listed above, reflecting the inherent complexity of providing longitudinal,  "comprehensive evaluation and management as the central hub for the patient's primary care services.  Vitals:    05/31/24 1306   BP: 135/78     Physical Exam    Vital Signs: BP: 135/78.  Constitutional: No acute distress. Normal appearance. Not ill-appearing.  Pulmonary: Pulmonary effort is normal. No respiratory distress.  Skin: Skin is not jaundiced.  Neurological: Alert. Mental status is at baseline.  Psychiatric: Mood normal. Behavior normal. Thought content normal.         This note was generated with the assistance of ambient listening technology. Verbal consent was obtained by the patient and accompanying visitor(s) for the recording of patient appointment to facilitate this note. I attest to having reviewed and edited the generated note for accuracy, though some syntax or spelling errors may persist. Please contact the author of this note for any clarification.      TOTAL TIME evaluating and managing this patient for this encounter was greater than or equal to 16 minutes.  This time was exclusive of any separately billable procedures for this patient and exclusive of time spent treating any other patient.    Documentation entered by me for this encounter may have been done in part using speech-recognition technology. Although I have made an effort to ensure accuracy, "sound like" errors may exist and should be interpreted in context.    Each patient to whom medical services are provided by telemedicine is: (1) informed of the relationship between the physician and patient and the respective role of any other health care provider with respect to management of the patient; and (2) notified that he or she may decline to receive medical services by telemedicine and may withdraw from such care at any time.   "

## 2024-08-27 ENCOUNTER — TELEPHONE (OUTPATIENT)
Dept: INTERNAL MEDICINE | Facility: CLINIC | Age: 34
End: 2024-08-27
Payer: COMMERCIAL

## 2024-08-27 ENCOUNTER — OFFICE VISIT (OUTPATIENT)
Dept: INTERNAL MEDICINE | Facility: CLINIC | Age: 34
End: 2024-08-27
Payer: COMMERCIAL

## 2024-08-27 DIAGNOSIS — F90.0 ATTENTION DEFICIT HYPERACTIVITY DISORDER (ADHD), PREDOMINANTLY INATTENTIVE TYPE: Chronic | ICD-10-CM

## 2024-08-27 PROCEDURE — 1160F RVW MEDS BY RX/DR IN RCRD: CPT | Mod: CPTII,95,, | Performed by: FAMILY MEDICINE

## 2024-08-27 PROCEDURE — 3044F HG A1C LEVEL LT 7.0%: CPT | Mod: CPTII,95,, | Performed by: FAMILY MEDICINE

## 2024-08-27 PROCEDURE — G2211 COMPLEX E/M VISIT ADD ON: HCPCS | Mod: 95,,, | Performed by: FAMILY MEDICINE

## 2024-08-27 PROCEDURE — 99213 OFFICE O/P EST LOW 20 MIN: CPT | Mod: 95,,, | Performed by: FAMILY MEDICINE

## 2024-08-27 PROCEDURE — 1159F MED LIST DOCD IN RCRD: CPT | Mod: CPTII,95,, | Performed by: FAMILY MEDICINE

## 2024-08-27 RX ORDER — DEXTROAMPHETAMINE SACCHARATE, AMPHETAMINE ASPARTATE MONOHYDRATE, DEXTROAMPHETAMINE SULFATE AND AMPHETAMINE SULFATE 6.25; 6.25; 6.25; 6.25 MG/1; MG/1; MG/1; MG/1
25 CAPSULE, EXTENDED RELEASE ORAL EVERY MORNING
Qty: 30 CAPSULE | Refills: 0 | Status: SHIPPED | OUTPATIENT
Start: 2024-08-27 | End: 2024-09-26

## 2024-08-27 RX ORDER — DEXTROAMPHETAMINE SACCHARATE, AMPHETAMINE ASPARTATE MONOHYDRATE, DEXTROAMPHETAMINE SULFATE AND AMPHETAMINE SULFATE 6.25; 6.25; 6.25; 6.25 MG/1; MG/1; MG/1; MG/1
25 CAPSULE, EXTENDED RELEASE ORAL EVERY MORNING
Qty: 30 CAPSULE | Refills: 0 | Status: SHIPPED | OUTPATIENT
Start: 2024-10-22 | End: 2024-11-21

## 2024-08-27 RX ORDER — DEXTROAMPHETAMINE SACCHARATE, AMPHETAMINE ASPARTATE MONOHYDRATE, DEXTROAMPHETAMINE SULFATE AND AMPHETAMINE SULFATE 6.25; 6.25; 6.25; 6.25 MG/1; MG/1; MG/1; MG/1
25 CAPSULE, EXTENDED RELEASE ORAL EVERY MORNING
Qty: 30 CAPSULE | Refills: 0 | Status: SHIPPED | OUTPATIENT
Start: 2024-09-24 | End: 2024-10-24

## 2024-08-27 NOTE — PROGRESS NOTES
TELEMEDICINE VIRTUAL VIDEO VISIT  8/27/24  1:20 PM CDT    Visit Type: Audiovisual    Patient's Location: Miranda represents that they are located within the state of [place].    CC: F/U ADD    1. Attention deficit hyperactivity disorder (ADHD), predominantly inattentive type  Overview:  For substantiating documentation, refer to chart media for document uploaded by patient 2/3/2023 at 1:21 PM: [email or web address]    Assessment & Plan:  Miranda reports that current treatment (Adderall-XR) is providing satisfactory relief of attention deficit disorder symptoms and helping compensate for deficits and improve level of functioning. Miranda appears to be tolerating current treatment well, reporting no mood instability, tics, disordered sleep, rapid heart rate, palpitations or other apparent adverse effects. I have observed no behaviors to suggest inappropriate use of prescribed medications. [place] Board [place] Pharmacy Controlled Prescription Drug Monitoring database was queried and showed no activity to suggest abuse, diversion, or other inappropriate use of prescription medications.     Orders:  -     dextroamphetamine-amphetamine (ADDERALL XR) 25 MG 24 hr capsule; Take 1 capsule (25 mg total) by mouth every morning.  Dispense: 30 capsule; Refill: 0  -     dextroamphetamine-amphetamine (ADDERALL XR) 25 MG 24 hr capsule; Take 1 capsule (25 mg total) by mouth every morning.  Dispense: 30 capsule; Refill: 0  -     dextroamphetamine-amphetamine (ADDERALL XR) 25 MG 24 hr capsule; Take 1 capsule (25 mg total) by mouth every morning.  Dispense: 30 capsule; Refill: 0       Review of Systems   Constitutional:  Negative for activity change and unexpected weight change.   HENT:  Negative for hearing loss, rhinorrhea and trouble swallowing.    Eyes:  Negative for discharge and visual disturbance.   Respiratory:  Negative for chest tightness and wheezing.    Cardiovascular:   Negative for chest pain and palpitations.   Gastrointestinal:  Negative for blood in stool, constipation, diarrhea and vomiting.   Endocrine: Negative for polydipsia and polyuria.   Genitourinary:  Negative for difficulty urinating, dysuria, hematuria and menstrual problem.   Musculoskeletal:  Negative for arthralgias, joint swelling and neck pain.   Neurological:  Negative for weakness and headaches.   Psychiatric/Behavioral:  Negative for confusion and dysphoric mood.      Today's visit involved the intricate management of episodic problem(s) and the ongoing care for the patient's serious or complex condition(s) listed above, reflecting the inherent complexity of providing longitudinal, comprehensive evaluation and management as the central hub for the patient's primary care services.  [place] Board of Pharmacy Controlled Prescription Drug Monitoring database was queried and showed no activity to suggest abuse, diversion, or other improper use of prescription medications.   There were no vitals filed for this visit.  PHYSICAL EXAM:  GENERAL APPEARANCE:  - Alert and grossly oriented.  - No apparent distress, breathing comfortably.     EYES:  - Sclera without icterus.     EARS, NOSE, AND THROAT:  - No visible abnormalities.     RESPIRATORY:  - No respiratory distress.  - No audible wheezing or cough.     PSYCHIATRIC:  - Mood and affect appropriate; behavior cooperative.    I spent a total of 10 minutes today evaluating and managing this patient for this encounter.  This includes face to face time and non-face to face time preparing to see the patient (eg, review of tests), obtaining and/or reviewing separately obtained history, documenting clinical information in the electronic or other health record, independently interpreting results and communicating results to the patient/family/caregiver, or care coordinator. This time was exclusive of any separately billable procedures for this patient and exclusive of time  "spent treating any other patient.    Documentation entered by me for this encounter may have been done in part using speech-recognition technology. Although I have made an effort to ensure accuracy, "sound like" errors may exist and should be interpreted in context.    Each patient to whom medical services are provided by telemedicine is: (1) informed of the relationship between the physician and patient and the respective role of any other health care provider with respect to management of the patient; and (2) notified that he or she may decline to receive medical services by telemedicine and may withdraw from such care at any time.     WRAP-UP INSTRUCTIONS  Follow up in about 12 weeks (around 11/19/2024) for virtual visit, re-evaluation.   "

## 2024-08-27 NOTE — ASSESSMENT & PLAN NOTE
Miranda reports that current treatment (Adderall-XR) is providing satisfactory relief of attention deficit disorder symptoms and helping compensate for deficits and improve level of functioning. Miranda appears to be tolerating current treatment well, reporting no mood instability, tics, disordered sleep, rapid heart rate, palpitations or other apparent adverse effects. I have observed no behaviors to suggest inappropriate use of prescribed medications. [place] Board of Pharmacy Controlled Prescription Drug Monitoring database was queried and showed no activity to suggest abuse, diversion, or other inappropriate use of prescription medications.

## 2024-11-20 ENCOUNTER — OFFICE VISIT (OUTPATIENT)
Dept: INTERNAL MEDICINE | Facility: CLINIC | Age: 34
End: 2024-11-20
Payer: COMMERCIAL

## 2024-11-20 DIAGNOSIS — F90.0 ATTENTION DEFICIT HYPERACTIVITY DISORDER (ADHD), PREDOMINANTLY INATTENTIVE TYPE: Chronic | ICD-10-CM

## 2024-11-20 PROCEDURE — 3044F HG A1C LEVEL LT 7.0%: CPT | Mod: CPTII,95,, | Performed by: FAMILY MEDICINE

## 2024-11-20 PROCEDURE — 1159F MED LIST DOCD IN RCRD: CPT | Mod: CPTII,95,, | Performed by: FAMILY MEDICINE

## 2024-11-20 PROCEDURE — 1160F RVW MEDS BY RX/DR IN RCRD: CPT | Mod: CPTII,95,, | Performed by: FAMILY MEDICINE

## 2024-11-20 PROCEDURE — 99213 OFFICE O/P EST LOW 20 MIN: CPT | Mod: 95,,, | Performed by: FAMILY MEDICINE

## 2024-11-20 PROCEDURE — G2211 COMPLEX E/M VISIT ADD ON: HCPCS | Mod: 95,,, | Performed by: FAMILY MEDICINE

## 2024-11-20 RX ORDER — DEXTROAMPHETAMINE SACCHARATE, AMPHETAMINE ASPARTATE MONOHYDRATE, DEXTROAMPHETAMINE SULFATE AND AMPHETAMINE SULFATE 6.25; 6.25; 6.25; 6.25 MG/1; MG/1; MG/1; MG/1
25 CAPSULE, EXTENDED RELEASE ORAL EVERY MORNING
Qty: 30 CAPSULE | Refills: 0 | Status: SHIPPED | OUTPATIENT
Start: 2024-11-20 | End: 2024-12-20

## 2024-11-20 RX ORDER — DEXTROAMPHETAMINE SACCHARATE, AMPHETAMINE ASPARTATE MONOHYDRATE, DEXTROAMPHETAMINE SULFATE AND AMPHETAMINE SULFATE 6.25; 6.25; 6.25; 6.25 MG/1; MG/1; MG/1; MG/1
25 CAPSULE, EXTENDED RELEASE ORAL EVERY MORNING
Qty: 30 CAPSULE | Refills: 0 | Status: SHIPPED | OUTPATIENT
Start: 2025-01-15 | End: 2025-02-14

## 2024-11-20 RX ORDER — DEXTROAMPHETAMINE SACCHARATE, AMPHETAMINE ASPARTATE MONOHYDRATE, DEXTROAMPHETAMINE SULFATE AND AMPHETAMINE SULFATE 6.25; 6.25; 6.25; 6.25 MG/1; MG/1; MG/1; MG/1
25 CAPSULE, EXTENDED RELEASE ORAL EVERY MORNING
Qty: 30 CAPSULE | Refills: 0 | Status: SHIPPED | OUTPATIENT
Start: 2024-12-18 | End: 2025-01-17

## 2024-11-20 NOTE — PROGRESS NOTES
"TELEMEDICINE VIRTUAL VIDEO VISIT  11/20/24  3:00 PM CST    Visit Type: Audiovisual    Patient's Location: Miranda represents that they are located within the [facility].    History of Present Illness    CHIEF COMPLAINT:  Miranda presents today for follow-up of ADHD medication management.    ADHD MANAGEMENT:  She currently takes Adderall XR 25 mg daily at 6:30 AM for ADHD, denying any side effects. 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 She perceives a decrease in effectiveness by her afternoon classes at noon and 2:00 PM, describing it as "trailing off" and "dipping out" around 3 PM. She is unsure if this is due to genuine changes in efficacy or related to her new teaching schedule. 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       Review of Systems   Constitutional:  Negative for activity change and unexpected weight change.   HENT:  Negative for hearing loss, rhinorrhea and trouble swallowing.    Eyes:  Negative for discharge and visual disturbance.   Respiratory:  Negative for chest tightness and wheezing.    Cardiovascular:  Negative for chest pain and palpitations.   Gastrointestinal:  Negative for blood in stool, constipation, diarrhea and vomiting.   Endocrine: Negative for polydipsia and polyuria.   Genitourinary:  Negative for difficulty urinating, dysuria, hematuria and menstrual problem.   Musculoskeletal:  Negative for arthralgias, joint swelling and neck pain.   Neurological:  Negative for weakness and headaches.   Psychiatric/Behavioral:  Negative for confusion and dysphoric mood.        Assessment & Plan    F90.0 Attention-deficit hyperactivity disorder, predominantly inattentive type     Assessed efficacy of current Adderall XR regimen, noting patient's reports of afternoon effectiveness decline   Considered potential need for treatment adjustment if afternoon " efficacy issues persist into next semester   Recommend drug holiday during upcoming break to potentially improve medication effectiveness upon restart    PATIENT EDUCATION:   Explained rationale for drug holiday, comparing it to the increased effectiveness of caffeine after abstinence    ACTION ITEMS/LIFESTYLE:   Miranda to take a drug holiday from Adderall XR during upcoming break, unless focus is needed for specific tasks    MEDICATIONS:   Continued Adderall XR 25 mg daily, taken around 6:30 AM. Refilled prescription. Miranda encouraged to fill prescriptions every 28 days during break to build up a stock supply.    FOLLOW UP:   Follow up soon, potentially with a virtual appointment   Option to schedule in-office appointment during break before spring semester or at the beginning of May   Miranda advised to get on waiting list if preferred appointment time is unavailable   Miranda to inform at next appointment if insurance allows 90-day supply, to simplify prescription process       1. Attention deficit hyperactivity disorder (ADHD), predominantly inattentive type  Overview:  For substantiating documentation, refer to chart media for document uploaded by patient 2/3/2023 at 1:21 PM: %[id number].-[facility]-2.[employer].co.pdf    Orders:  -     dextroamphetamine-amphetamine (ADDERALL XR) 25 MG 24 hr capsule; Take 1 capsule (25 mg total) by mouth every morning.  Dispense: 30 capsule; Refill: 0  -     dextroamphetamine-amphetamine (ADDERALL XR) 25 MG 24 hr capsule; Take 1 capsule (25 mg total) by mouth every morning.  Dispense: 30 capsule; Refill: 0  -     dextroamphetamine-amphetamine (ADDERALL XR) 25 MG 24 hr capsule; Take 1 capsule (25 mg total) by mouth every morning.  Dispense: 30 capsule; Refill: 0    No other significant complaints or concerns were reported.  Today's visit involved the intricate management of episodic problem(s) and the ongoing care for the patient's serious or  complex condition(s) listed above, reflecting the inherent complexity of providing longitudinal, comprehensive evaluation and management as the central hub for the patient's primary care services.  [place] Board of Pharmacy Controlled Prescription Drug Monitoring database was queried and showed no activity to suggest abuse, diversion, or other improper use of prescription medications.   There were no vitals filed for this visit.  PHYSICAL EXAM:  GENERAL APPEARANCE:  - Alert and grossly oriented.  - No apparent distress, breathing comfortably.     EYES:  - Sclera without icterus.     EARS, NOSE, AND THROAT:  - No visible abnormalities.     RESPIRATORY:  - No respiratory distress.  - No audible wheezing or cough.     PSYCHIATRIC:  - Mood and affect appropriate; behavior cooperative.  This note was generated with the assistance of ambient listening technology. Verbal consent was obtained by the patient and accompanying visitor(s) for the recording of patient appointment to facilitate this note. I attest to having reviewed and edited the generated note for accuracy, though some syntax or spelling errors may persist. Please contact the author of this note for any clarification.      I spent a total of 10 minutes today evaluating and managing this patient for this encounter.  This includes face to face time and non-face to face time preparing to see the patient (eg, review of tests), obtaining and/or reviewing separately obtained history, documenting clinical information in the electronic or other health record, independently interpreting results and communicating results to the patient/family/caregiver, or care coordinator.  This time was exclusive of any separately billable procedures for this patient and exclusive of time spent treating any other patient.    Documentation entered by me for this encounter may have been done in part using speech-recognition technology. Although I have made an effort to ensure accuracy,  ""sound like" errors may exist and should be interpreted in context.    Each patient to whom medical services are provided by telemedicine is: (1) informed of the relationship between the physician and patient and the respective role of any other health care provider with respect to management of the patient; and (2) notified that he or she may decline to receive medical services by telemedicine and may withdraw from such care at any time.  "

## 2025-02-17 ENCOUNTER — OFFICE VISIT (OUTPATIENT)
Dept: INTERNAL MEDICINE | Facility: CLINIC | Age: 35
End: 2025-02-17
Payer: COMMERCIAL

## 2025-02-17 DIAGNOSIS — F90.0 ATTENTION DEFICIT HYPERACTIVITY DISORDER (ADHD), PREDOMINANTLY INATTENTIVE TYPE: Chronic | ICD-10-CM

## 2025-02-17 RX ORDER — DEXTROAMPHETAMINE SACCHARATE, AMPHETAMINE ASPARTATE MONOHYDRATE, DEXTROAMPHETAMINE SULFATE AND AMPHETAMINE SULFATE 6.25; 6.25; 6.25; 6.25 MG/1; MG/1; MG/1; MG/1
25 CAPSULE, EXTENDED RELEASE ORAL EVERY MORNING
Qty: 30 CAPSULE | Refills: 0 | Status: SHIPPED | OUTPATIENT
Start: 2025-04-14 | End: 2025-05-14

## 2025-02-17 RX ORDER — DEXTROAMPHETAMINE SACCHARATE, AMPHETAMINE ASPARTATE MONOHYDRATE, DEXTROAMPHETAMINE SULFATE AND AMPHETAMINE SULFATE 6.25; 6.25; 6.25; 6.25 MG/1; MG/1; MG/1; MG/1
25 CAPSULE, EXTENDED RELEASE ORAL EVERY MORNING
Qty: 30 CAPSULE | Refills: 0 | Status: SHIPPED | OUTPATIENT
Start: 2025-02-17 | End: 2025-03-19

## 2025-02-17 RX ORDER — DEXTROAMPHETAMINE SACCHARATE, AMPHETAMINE ASPARTATE MONOHYDRATE, DEXTROAMPHETAMINE SULFATE AND AMPHETAMINE SULFATE 6.25; 6.25; 6.25; 6.25 MG/1; MG/1; MG/1; MG/1
25 CAPSULE, EXTENDED RELEASE ORAL EVERY MORNING
Qty: 30 CAPSULE | Refills: 0 | Status: SHIPPED | OUTPATIENT
Start: 2025-03-17 | End: 2025-04-16

## 2025-02-17 NOTE — PROGRESS NOTES
TELEMEDICINE VIRTUAL VIDEO VISIT  2/17/25  1:00 PM CST    Visit Type: Audiovisual    Patient's Location: Miranda represents that they are located within the state of [place].    CHIEF COMPLAINT: Follow-up    Miranda reports that current treatment (Adderall-XR) is providing satisfactory relief of attention deficit disorder symptoms and helping compensate for deficits and improve level of functioning. Miranda appears to be tolerating current treatment well, reporting no mood instability, tics, disordered sleep, rapid heart rate, palpitations or other apparent adverse effects.    1. Attention deficit hyperactivity disorder (ADHD), predominantly inattentive type  Overview:  For substantiating documentation, refer to chart media for document uploaded by patient 2/3/2023 at 1:21 PM: %[id number].-[facility]-2.[employer].pdf    Orders:  -     dextroamphetamine-amphetamine (ADDERALL XR) 25 MG 24 hr capsule; Take 1 capsule (25 mg total) by mouth every morning.  Dispense: 30 capsule; Refill: 0  -     dextroamphetamine-amphetamine (ADDERALL XR) 25 MG 24 hr capsule; Take 1 capsule (25 mg total) by mouth every morning.  Dispense: 30 capsule; Refill: 0  -     dextroamphetamine-amphetamine (ADDERALL XR) 25 MG 24 hr capsule; Take 1 capsule (25 mg total) by mouth every morning.  Dispense: 30 capsule; Refill: 0    No other significant complaints or concerns were reported.  Today's visit involved the intricate management of episodic problem(s) and the ongoing care for the patient's serious or complex condition(s) listed above, reflecting the inherent complexity of providing longitudinal, comprehensive evaluation and management as the central hub for the patient's primary care services.  [place] Board of Pharmacy Controlled Prescription Drug Monitoring database was queried and showed no activity to suggest abuse, diversion, or other improper use of prescription medications.   There were no vitals  filed for this visit.  PHYSICAL EXAM:  GENERAL APPEARANCE:  - Alert and grossly oriented.  - No apparent distress, breathing comfortably.     EYES:  - Sclera without icterus.     EARS, NOSE, AND THROAT:  - No visible abnormalities.     RESPIRATORY:  - No respiratory distress.  - No audible wheezing or cough.     PSYCHIATRIC:  - Mood and affect appropriate; behavior cooperative.  Review of Systems   Constitutional:  Negative for activity change and unexpected weight change.   HENT:  Negative for hearing loss, rhinorrhea and trouble swallowing.    Eyes:  Negative for discharge and visual disturbance.   Respiratory:  Negative for chest tightness and wheezing.    Cardiovascular:  Negative for chest pain and palpitations.   Gastrointestinal:  Negative for blood in stool, constipation, diarrhea and vomiting.   Endocrine: Negative for polydipsia and polyuria.   Genitourinary:  Negative for difficulty urinating, dysuria, hematuria and menstrual problem.   Musculoskeletal:  Negative for arthralgias, joint swelling and neck pain.   Neurological:  Negative for weakness and headaches.   Psychiatric/Behavioral:  Negative for confusion and dysphoric mood.       Future Appointments   Date Time Provider Department [place]   5/5/2025 10:20 AM SHANICE Gillis MD [facility] [place] spent a total of 10 minutes today evaluating and managing this patient for this encounter.  This includes face to face time and non-face to face time preparing to see the patient (eg, review of tests), obtaining and/or reviewing separately obtained history, documenting clinical information in the electronic or other health record, independently interpreting results and communicating results to the patient/family/caregiver, or care coordinator.  This time was exclusive of any separately billable procedures for this patient and exclusive of time spent treating any other patient.    Documentation entered by me for this encounter may have been done in  "part using speech-recognition technology. Although I have made an effort to ensure accuracy, "sound like" errors may exist and should be interpreted in context.  Each patient to whom medical services are provided by telemedicine is: (1) informed of the relationship between the physician and patient and the respective role of any other health care provider with respect to management of the patient; and (2) notified that he or she may decline to receive medical services by telemedicine and may withdraw from such care at any time.  "

## 2025-05-05 ENCOUNTER — OFFICE VISIT (OUTPATIENT)
Dept: INTERNAL MEDICINE | Facility: CLINIC | Age: 35
End: 2025-05-05
Payer: COMMERCIAL

## 2025-05-05 ENCOUNTER — LAB VISIT (OUTPATIENT)
Dept: LAB | Facility: HOSPITAL | Age: 35
End: 2025-05-05
Attending: FAMILY MEDICINE
Payer: COMMERCIAL

## 2025-05-05 VITALS
HEIGHT: 65 IN | OXYGEN SATURATION: 100 % | DIASTOLIC BLOOD PRESSURE: 60 MMHG | TEMPERATURE: 98 F | HEART RATE: 104 BPM | BODY MASS INDEX: 24.72 KG/M2 | RESPIRATION RATE: 18 BRPM | WEIGHT: 148.38 LBS | SYSTOLIC BLOOD PRESSURE: 110 MMHG

## 2025-05-05 DIAGNOSIS — F90.0 ATTENTION DEFICIT HYPERACTIVITY DISORDER (ADHD), PREDOMINANTLY INATTENTIVE TYPE: Chronic | ICD-10-CM

## 2025-05-05 DIAGNOSIS — L81.6 HYPOPIGMENTATION OF SKIN: ICD-10-CM

## 2025-05-05 DIAGNOSIS — Z00.00 PREVENTATIVE HEALTH CARE: ICD-10-CM

## 2025-05-05 DIAGNOSIS — Z13.6 ENCOUNTER FOR LIPID SCREENING FOR CARDIOVASCULAR DISEASE: ICD-10-CM

## 2025-05-05 DIAGNOSIS — Z31.41 FERTILITY TESTING: Primary | ICD-10-CM

## 2025-05-05 DIAGNOSIS — Z00.00 PREVENTATIVE HEALTH CARE: Primary | ICD-10-CM

## 2025-05-05 DIAGNOSIS — Z13.220 ENCOUNTER FOR LIPID SCREENING FOR CARDIOVASCULAR DISEASE: ICD-10-CM

## 2025-05-05 DIAGNOSIS — L21.9 SEBORRHEIC DERMATITIS: Chronic | ICD-10-CM

## 2025-05-05 DIAGNOSIS — Z31.41 FERTILITY TESTING: ICD-10-CM

## 2025-05-05 LAB
ABSOLUTE EOSINOPHIL (OHS): 0.14 K/UL
ABSOLUTE MONOCYTE (OHS): 0.77 K/UL (ref 0.3–1)
ABSOLUTE NEUTROPHIL COUNT (OHS): 3.65 K/UL (ref 1.8–7.7)
ALBUMIN SERPL BCP-MCNC: 3.9 G/DL (ref 3.5–5.2)
ALP SERPL-CCNC: 73 UNIT/L (ref 40–150)
ALT SERPL W/O P-5'-P-CCNC: 21 UNIT/L (ref 10–44)
ANION GAP (OHS): 8 MMOL/L (ref 8–16)
AST SERPL-CCNC: 18 UNIT/L (ref 11–45)
BASOPHILS # BLD AUTO: 0.06 K/UL
BASOPHILS NFR BLD AUTO: 0.9 %
BILIRUB SERPL-MCNC: 1 MG/DL (ref 0.1–1)
BUN SERPL-MCNC: 10 MG/DL (ref 6–20)
CALCIUM SERPL-MCNC: 9.1 MG/DL (ref 8.7–10.5)
CHLORIDE SERPL-SCNC: 105 MMOL/L (ref 95–110)
CHOLEST SERPL-MCNC: 180 MG/DL (ref 120–199)
CHOLEST/HDLC SERPL: 2.7 {RATIO} (ref 2–5)
CO2 SERPL-SCNC: 24 MMOL/L (ref 23–29)
CREAT SERPL-MCNC: 0.7 MG/DL (ref 0.5–1.4)
DHEA-S SERPL-MCNC: 252.4 UG/DL (ref 74.8–410.2)
EAG (OHS): 97 MG/DL (ref 68–131)
ERYTHROCYTE [DISTWIDTH] IN BLOOD BY AUTOMATED COUNT: 12.8 % (ref 11.5–14.5)
ESTRADIOL SERPL HS-MCNC: 60 PG/ML
FSH SERPL-ACNC: 7.32 MIU/ML
GFR SERPLBLD CREATININE-BSD FMLA CKD-EPI: >60 ML/MIN/1.73/M2
GLUCOSE SERPL-MCNC: 95 MG/DL (ref 70–110)
HBA1C MFR BLD: 5 % (ref 4–5.6)
HCT VFR BLD AUTO: 46.4 % (ref 37–48.5)
HDLC SERPL-MCNC: 67 MG/DL (ref 40–75)
HDLC SERPL: 37.2 % (ref 20–50)
HGB BLD-MCNC: 14.9 GM/DL (ref 12–16)
IMM GRANULOCYTES # BLD AUTO: 0.01 K/UL (ref 0–0.04)
IMM GRANULOCYTES NFR BLD AUTO: 0.1 % (ref 0–0.5)
LDLC SERPL CALC-MCNC: 100.6 MG/DL (ref 63–159)
LH SERPL-ACNC: 6.1 MIU/ML
LYMPHOCYTES # BLD AUTO: 2.12 K/UL (ref 1–4.8)
MCH RBC QN AUTO: 30.4 PG (ref 27–31)
MCHC RBC AUTO-ENTMCNC: 32.1 G/DL (ref 32–36)
MCV RBC AUTO: 95 FL (ref 82–98)
NONHDLC SERPL-MCNC: 113 MG/DL
NUCLEATED RBC (/100WBC) (OHS): 0 /100 WBC
PLATELET # BLD AUTO: 459 K/UL (ref 150–450)
PMV BLD AUTO: 10.2 FL (ref 9.2–12.9)
POTASSIUM SERPL-SCNC: 4.6 MMOL/L (ref 3.5–5.1)
PROGEST SERPL-MCNC: 1 NG/ML
PROT SERPL-MCNC: 7.2 GM/DL (ref 6–8.4)
RBC # BLD AUTO: 4.9 M/UL (ref 4–5.4)
RELATIVE EOSINOPHIL (OHS): 2.1 %
RELATIVE LYMPHOCYTE (OHS): 31.4 % (ref 18–48)
RELATIVE MONOCYTE (OHS): 11.4 % (ref 4–15)
RELATIVE NEUTROPHIL (OHS): 54.1 % (ref 38–73)
SODIUM SERPL-SCNC: 137 MMOL/L (ref 136–145)
T4 FREE SERPL-MCNC: 0.93 NG/DL (ref 0.71–1.51)
TRIGL SERPL-MCNC: 62 MG/DL (ref 30–150)
TSH SERPL-ACNC: 0.93 UIU/ML (ref 0.4–4)
WBC # BLD AUTO: 6.75 K/UL (ref 3.9–12.7)

## 2025-05-05 PROCEDURE — 82670 ASSAY OF TOTAL ESTRADIOL: CPT

## 2025-05-05 PROCEDURE — 3044F HG A1C LEVEL LT 7.0%: CPT | Mod: CPTII,S$GLB,, | Performed by: FAMILY MEDICINE

## 2025-05-05 PROCEDURE — 99999 PR PBB SHADOW E&M-EST. PATIENT-LVL IV: CPT | Mod: PBBFAC,,, | Performed by: FAMILY MEDICINE

## 2025-05-05 PROCEDURE — 3078F DIAST BP <80 MM HG: CPT | Mod: CPTII,S$GLB,, | Performed by: FAMILY MEDICINE

## 2025-05-05 PROCEDURE — 84439 ASSAY OF FREE THYROXINE: CPT

## 2025-05-05 PROCEDURE — 83001 ASSAY OF GONADOTROPIN (FSH): CPT

## 2025-05-05 PROCEDURE — 80053 COMPREHEN METABOLIC PANEL: CPT

## 2025-05-05 PROCEDURE — 3074F SYST BP LT 130 MM HG: CPT | Mod: CPTII,S$GLB,, | Performed by: FAMILY MEDICINE

## 2025-05-05 PROCEDURE — 84402 ASSAY OF FREE TESTOSTERONE: CPT

## 2025-05-05 PROCEDURE — 1160F RVW MEDS BY RX/DR IN RCRD: CPT | Mod: CPTII,S$GLB,, | Performed by: FAMILY MEDICINE

## 2025-05-05 PROCEDURE — 83036 HEMOGLOBIN GLYCOSYLATED A1C: CPT

## 2025-05-05 PROCEDURE — 82627 DEHYDROEPIANDROSTERONE: CPT

## 2025-05-05 PROCEDURE — 84144 ASSAY OF PROGESTERONE: CPT

## 2025-05-05 PROCEDURE — 99214 OFFICE O/P EST MOD 30 MIN: CPT | Mod: 25,S$GLB,, | Performed by: FAMILY MEDICINE

## 2025-05-05 PROCEDURE — 83002 ASSAY OF GONADOTROPIN (LH): CPT

## 2025-05-05 PROCEDURE — 82652 VIT D 1 25-DIHYDROXY: CPT

## 2025-05-05 PROCEDURE — 1159F MED LIST DOCD IN RCRD: CPT | Mod: CPTII,S$GLB,, | Performed by: FAMILY MEDICINE

## 2025-05-05 PROCEDURE — 3008F BODY MASS INDEX DOCD: CPT | Mod: CPTII,S$GLB,, | Performed by: FAMILY MEDICINE

## 2025-05-05 PROCEDURE — 99395 PREV VISIT EST AGE 18-39: CPT | Mod: S$GLB,,, | Performed by: FAMILY MEDICINE

## 2025-05-05 PROCEDURE — 84443 ASSAY THYROID STIM HORMONE: CPT

## 2025-05-05 PROCEDURE — 85025 COMPLETE CBC W/AUTO DIFF WBC: CPT

## 2025-05-05 PROCEDURE — 80061 LIPID PANEL: CPT

## 2025-05-05 PROCEDURE — 36415 COLL VENOUS BLD VENIPUNCTURE: CPT

## 2025-05-05 RX ORDER — HYDROCORTISONE 25 MG/G
CREAM TOPICAL
Qty: 20 G | Refills: 0 | Status: SHIPPED | OUTPATIENT
Start: 2025-05-05

## 2025-05-05 RX ORDER — KETOCONAZOLE 20 MG/ML
SHAMPOO, SUSPENSION TOPICAL
Qty: 240 ML | Refills: 2 | Status: SHIPPED | OUTPATIENT
Start: 2025-05-05

## 2025-05-05 RX ORDER — DEXTROAMPHETAMINE SACCHARATE, AMPHETAMINE ASPARTATE MONOHYDRATE, DEXTROAMPHETAMINE SULFATE AND AMPHETAMINE SULFATE 6.25; 6.25; 6.25; 6.25 MG/1; MG/1; MG/1; MG/1
25 CAPSULE, EXTENDED RELEASE ORAL EVERY MORNING
Qty: 30 CAPSULE | Refills: 0 | Status: SHIPPED | OUTPATIENT
Start: 2025-06-30 | End: 2025-07-30

## 2025-05-05 RX ORDER — DEXTROAMPHETAMINE SACCHARATE, AMPHETAMINE ASPARTATE MONOHYDRATE, DEXTROAMPHETAMINE SULFATE AND AMPHETAMINE SULFATE 6.25; 6.25; 6.25; 6.25 MG/1; MG/1; MG/1; MG/1
25 CAPSULE, EXTENDED RELEASE ORAL EVERY MORNING
Qty: 30 CAPSULE | Refills: 0 | Status: SHIPPED | OUTPATIENT
Start: 2025-05-05 | End: 2025-06-04

## 2025-05-05 RX ORDER — DEXTROAMPHETAMINE SACCHARATE, AMPHETAMINE ASPARTATE MONOHYDRATE, DEXTROAMPHETAMINE SULFATE AND AMPHETAMINE SULFATE 6.25; 6.25; 6.25; 6.25 MG/1; MG/1; MG/1; MG/1
25 CAPSULE, EXTENDED RELEASE ORAL EVERY MORNING
Qty: 30 CAPSULE | Refills: 0 | Status: SHIPPED | OUTPATIENT
Start: 2025-06-02 | End: 2025-07-02

## 2025-05-05 NOTE — PROGRESS NOTES
ANNUAL WELLNESS VISIT (PREVENTIVE SERVICES)  5/5/25 10:20 AM CDT    CHIEF COMPLAINT: Annual Exam    HEALTH MAINTENANCE INTERVENTIONS - UP TO DATE  Health Maintenance Topics with due status: Not Due       Topic Last Completion Date    TETANUS VACCINE 04/01/2018    Influenza Vaccine 11/23/2021    Cervical Cancer Screening 02/16/2023    RSV Vaccine (Age 60+ and Pregnant patients) Not Due     HEALTH MAINTENANCE INTERVENTIONS - DUE OR DUE SOON  Health Maintenance Due   Topic Date Due    COVID-19 Vaccine (4 - 2024-25 season) 09/01/2024     Social Drivers of Health     Tobacco Use: Low Risk  (5/5/2025)    Patient History     Smoking Tobacco Use: Never     Smokeless Tobacco Use: Never     Passive Exposure: Not on file   Alcohol Use: Alcohol Misuse (3/10/2024)    AUDIT-C     Frequency of Alcohol Consumption: 2-3 times a week     Average Number of Drinks: 1 or 2     Frequency of Binge Drinking: Less than monthly   Financial Resource Strain: Low Risk  (3/10/2024)    Overall Financial Resource Strain (CARDIA)     Difficulty of Paying Living Expenses: Not hard at all   Food Insecurity: No Food Insecurity (3/10/2024)    Hunger Vital Sign     Worried About Running Out of Food in the Last Year: Never true     Ran Out of Food in the Last Year: Never true   Transportation Needs: No Transportation Needs (3/10/2024)    PRAPARE - Transportation     Lack of Transportation (Medical): No     Lack of Transportation (Non-Medical): No   Physical Activity: Insufficiently Active (3/10/2024)    Exercise Vital Sign     Days of Exercise per Week: 2 days     Minutes of Exercise per Session: 30 min   Stress: No Stress Concern Present (3/10/2024)    [employer] [place] [employer] - Occupational Stress Questionnaire     Feeling of Stress : Only a little   Housing Stability: Low Risk  (3/10/2024)    Housing Stability Vital Sign     Unable to Pay for Housing in the Last Year: No     Number of Places Lived in the Last Year: 2     Unstable  Housing in the Last Year: No   Depression: Low Risk  (5/5/2025)    Depression     Last PHQ-4: Flowsheet Data: 0   Utilities: Not on file   Health Literacy: Not on file   Social Isolation: Not on file     Miranda is here for annual wellness and preventive services visit.  Health maintenance care gaps reviewed; relevant closure actions recommended.  USPSTF-recommended screenings advocated.       Miranda is also here for evaluation and management of unrelated problems reported separately.    1. Preventative health care  -     Lipid Panel; Future; Expected date: 05/05/2025    2. Encounter for lipid screening for cardiovascular disease  -     Lipid Panel; Future; Expected date: 05/05/2025    3. Attention deficit hyperactivity disorder (ADHD), predominantly inattentive type  Overview:  For substantiating documentation, refer to chart media for document uploaded by patient 2/3/2023 at 1:21 PM: %[id number]-[id number].-[facility]-2.[employer].co.pdf    Assessment & Plan:  She reports that current treatment (Adderall-XR) is providing satisfactory relief of attention deficit disorder symptoms and helping compensate for deficits and improve level of functioning. She appears to be tolerating current treatment well, reporting no mood instability, tics, disordered sleep, rapid heart rate, palpitations or other apparent adverse effects. I have observed no behaviors to suggest inappropriate use of prescribed medications. [place] Board of Pharmacy Controlled Prescription Drug Monitoring database was queried and showed no activity to suggest abuse, diversion, or other inappropriate use of prescription medications.     Orders:  -     dextroamphetamine-amphetamine (ADDERALL XR) 25 MG 24 hr capsule; Take 1 capsule (25 mg total) by mouth every morning.  Dispense: 30 capsule; Refill: 0  -     dextroamphetamine-amphetamine (ADDERALL XR) 25 MG 24 hr capsule; Take 1 capsule (25 mg total) by mouth every morning.   "Dispense: 30 capsule; Refill: 0  -     dextroamphetamine-amphetamine (ADDERALL XR) 25 MG 24 hr capsule; Take 1 capsule (25 mg total) by mouth every morning.  Dispense: 30 capsule; Refill: 0    4. Seborrheic dermatitis  -     hydrocortisone 2.5 % cream; Apply SPARINGLY to areas of skin irritation twice daily as needed (Use only as needed, max use 5 days.)  Dispense: 20 g; Refill: 0  -     ketoconazole (NIZORAL) 2 % shampoo; Apply to a clean scalp and skin under facial hair twice weekly for 4 weeks.  Dispense: 240 mL; Refill: 2    5. Hypopigmentation of skin  -     E-Consult to Dermatology       Review of Systems   Constitutional:  Negative for activity change and unexpected weight change.   HENT:  Negative for hearing loss, rhinorrhea and trouble swallowing.    Eyes:  Negative for discharge and visual disturbance.   Respiratory:  Negative for chest tightness and wheezing.    Cardiovascular:  Negative for chest pain and palpitations.   Gastrointestinal:  Negative for blood in stool, constipation, diarrhea and vomiting.   Endocrine: Negative for polydipsia and polyuria.   Genitourinary:  Negative for difficulty urinating, dysuria, hematuria and menstrual problem.   Musculoskeletal:  Negative for arthralgias, joint swelling and neck pain.   Neurological:  Negative for weakness and headaches.   Psychiatric/Behavioral:  Negative for confusion and dysphoric mood.        Vitals:    05/05/25 1021   BP: 110/60   BP Location: Right arm   Patient Position: Sitting   Pulse: 104   Resp: 18   Temp: 98.2 °F (36.8 °C)   TempSrc: Tympanic   SpO2: 100%   Weight: 67.3 kg (148 lb 5.9 oz)   Height: 5' 5" (1.651 m)   Physical Exam  Vitals reviewed.   Constitutional:       General: She is not in acute distress.     Appearance: Normal appearance. She is not ill-appearing, toxic-appearing or diaphoretic.   HENT:      Head: Normocephalic and atraumatic.   Eyes:      General: No scleral icterus.     Conjunctiva/sclera: Conjunctivae normal. "   Neck:      Thyroid: No thyroid mass, thyromegaly or thyroid tenderness.      Vascular: No carotid bruit.   Cardiovascular:      Rate and Rhythm: Normal rate and regular rhythm.   Pulmonary:      Effort: Pulmonary effort is normal.      Breath sounds: Normal breath sounds.   Abdominal:      General: Bowel sounds are normal.      Palpations: Abdomen is soft.      Tenderness: There is no abdominal tenderness.   Skin:     General: Skin is warm and dry.   Neurological:      General: No focal deficit present.      Mental Status: She is alert and oriented to person, place, and time. Mental status is at baseline.      Motor: No weakness.   Psychiatric:         Mood and Affect: Mood normal.         Behavior: Behavior normal.         Judgment: Judgment normal.         ADDITIONAL E&M SERVICES PROVIDED - UNRELATED TO PREVENTIVE SERVICES  5/5/25 10:20 AM CDT    In addition to and unrelated to the preventive services provided this date, the following condition(s) were also evaluated and managed.    Attention deficit hyperactivity disorder (ADHD), predominantly inattentive type: I conducted medication management follow-up. She reports that Adderall XR 25 mg daily provides satisfactory symptom control overall, but with decreased efficacy toward the end of the semester compared to the start. She states a preference for continuing drug holidays over the summer rather than adjusting the medication dose. She denies adverse effects including mood changes, tics, sleep disturbance, rapid heart rate, or palpitations. Recent controlled substance monitoring did not show evidence of misuse or diversion. Her prescriptions for dextroamphetamine-amphetamine (Adderall XR) 25 mg 24 hr capsule were continued. I provided education about the benefits of maintaining lower stimulant doses to avoid tolerance.    Seborrheic dermatitis: She has a history of using ketoconazole cream intermittently but has not used it in the past two to three months and  currently reports no significant symptoms. Hydrocortisone 2.5% cream was continued with instructions to apply sparingly to areas of skin irritation for a maximum of five days as needed. Ketoconazole (Nizoral) 2% shampoo was continued for use on scalp and facial hair twice weekly for four weeks as directed.    Hypopigmentation of skin: Approximately one week ago, she noted new hypopigmentation on her hand without associated symptoms such as pruritus or discomfort. There was no history of relevant exposures or activities. I initiated an electronic consult with dermatology for evaluation of the affected area.    She is scheduled to follow up with me in three months. A dermatology E-consult was ordered for evaluation of hypopigmentation.  Problem List Items Addressed This Visit       Attention deficit hyperactivity disorder (ADHD), predominantly inattentive type (Chronic)    Overview   For substantiating documentation, refer to chart media for document uploaded by patient 2/3/2023 at 1:21 PM: %2Fado-api-[id number].-[facility]-2.[employer].co.pdf         Current Assessment & Plan   She reports that current treatment (Adderall-XR) is providing satisfactory relief of attention deficit disorder symptoms and helping compensate for deficits and improve level of functioning. She appears to be tolerating current treatment well, reporting no mood instability, tics, disordered sleep, rapid heart rate, palpitations or other apparent adverse effects. I have observed no behaviors to suggest inappropriate use of prescribed medications. [place] Board of Pharmacy Controlled Prescription Drug Monitoring database was queried and showed no activity to suggest abuse, diversion, or other inappropriate use of prescription medications.          Relevant Medications    dextroamphetamine-amphetamine (ADDERALL XR) 25 MG 24 hr capsule    dextroamphetamine-amphetamine (ADDERALL XR) 25 MG 24 hr capsule (Start on 6/2/2025)  "   dextroamphetamine-amphetamine (ADDERALL XR) 25 MG 24 hr capsule (Start on 6/30/2025)    Hypopigmentation of skin    Relevant Orders    E-Consult to Dermatology (Completed)    Seborrheic dermatitis (Chronic)    Relevant Medications    hydrocortisone 2.5 % cream    ketoconazole (NIZORAL) 2 % shampoo     Vitals:    05/05/25 1021   BP: 110/60   BP Location: Right arm   Patient Position: Sitting   Pulse: 104   Resp: 18   Temp: 98.2 °F (36.8 °C)   TempSrc: Tympanic   SpO2: 100%   Weight: 67.3 kg (148 lb 5.9 oz)   Height: 5' 5" (1.651 m)   Physical Exam  Vitals reviewed.   Constitutional:       General: She is not in acute distress.     Appearance: Normal appearance. She is not ill-appearing, toxic-appearing or diaphoretic.   HENT:      Head: Normocephalic and atraumatic.   Eyes:      General: No scleral icterus.     Conjunctiva/sclera: Conjunctivae normal.   Neck:      Thyroid: No thyroid mass, thyromegaly or thyroid tenderness.      Vascular: No carotid bruit.   Cardiovascular:      Rate and Rhythm: Normal rate and regular rhythm.   Pulmonary:      Effort: Pulmonary effort is normal.      Breath sounds: Normal breath sounds.   Abdominal:      General: Bowel sounds are normal.      Palpations: Abdomen is soft.      Tenderness: There is no abdominal tenderness.   Skin:     General: Skin is warm and dry.   Neurological:      General: No focal deficit present.      Mental Status: She is alert and oriented to person, place, and time. Mental status is at baseline.      Motor: No weakness.   Psychiatric:         Mood and Affect: Mood normal.         Behavior: Behavior normal.         Judgment: Judgment normal.       This note was generated with the assistance of ambient listening technology. Verbal consent was obtained by the patient and accompanying visitor(s) for the recording of patient appointment to facilitate this note. I attest to having reviewed and edited the generated note for accuracy, though some syntax or " "spelling errors may persist. Please contact the author of this note for any clarification.    Documentation entered by me for this encounter may have been done in part using speech-recognition technology. Although I have made an effort to ensure accuracy, "sound like" errors may exist and should be interpreted in context.  "

## 2025-05-06 ENCOUNTER — E-CONSULT (OUTPATIENT)
Dept: DERMATOLOGY | Facility: CLINIC | Age: 35
End: 2025-05-06

## 2025-05-06 DIAGNOSIS — L98.8 BIER SPOTS: Primary | ICD-10-CM

## 2025-05-06 NOTE — CONSULTS
[facility]  Response for E-Consult     Patient Name: Miranda Garcia  MRN: [id number]  Primary Care Provider: SHANICE Gillis MD   Requesting Provider: SHANICE Gillis MD  E-Consult to Dermatology  Consult performed by: Toma Dumont MD  Consult ordered by: SHANICE Gillis MD  Reason for consult: Hypopigmented areas  Assessment/Recommendations: Thank you for this consult. 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           Recommendation: as above     Additional future steps to consider: Reconsult if the condition worsens or fails to improve       Total time of Consultation: 5 minute    I did not speak to the requesting provider verbally about this.     *This eConsult is based on the clinical data available to me and is furnished without benefit of a physical examination. The eConsult will need to be interpreted in light of any clinical issues or changes in patient status not available to me at the time of filing this eConsults. Significant changes in patient condition or level of acuity should result in immediate formal consultation and reevaluation. Please alert me if you have further questions.    Thank you for this eConsult referral.     Toma Dumont MD  [facility]

## 2025-05-08 LAB
W FREE TESTOSTERONE: 1 PG/ML
W VITAMIN D, 1, 25-DIHYDROXY: 53 PG/ML

## 2025-05-09 ENCOUNTER — RESULTS FOLLOW-UP (OUTPATIENT)
Dept: OBSTETRICS AND GYNECOLOGY | Facility: CLINIC | Age: 35
End: 2025-05-09

## 2025-05-11 ENCOUNTER — RESULTS FOLLOW-UP (OUTPATIENT)
Dept: INTERNAL MEDICINE | Facility: CLINIC | Age: 35
End: 2025-05-11

## 2025-05-11 PROBLEM — L81.6 HYPOPIGMENTATION OF SKIN: Status: ACTIVE | Noted: 2025-05-11

## 2025-05-11 NOTE — ASSESSMENT & PLAN NOTE
She reports that current treatment (Adderall-XR) is providing satisfactory relief of attention deficit disorder symptoms and helping compensate for deficits and improve level of functioning. She appears to be tolerating current treatment well, reporting no mood instability, tics, disordered sleep, rapid heart rate, palpitations or other apparent adverse effects. I have observed no behaviors to suggest inappropriate use of prescribed medications. [place] [employer] Controlled Prescription Drug Monitoring database was queried and showed no activity to suggest abuse, diversion, or other inappropriate use of prescription medications.